# Patient Record
Sex: MALE | Race: WHITE | NOT HISPANIC OR LATINO | Employment: FULL TIME | URBAN - METROPOLITAN AREA
[De-identification: names, ages, dates, MRNs, and addresses within clinical notes are randomized per-mention and may not be internally consistent; named-entity substitution may affect disease eponyms.]

---

## 2019-02-18 ENCOUNTER — OFFICE VISIT (OUTPATIENT)
Dept: FAMILY MEDICINE | Facility: CLINIC | Age: 54
End: 2019-02-18
Payer: COMMERCIAL

## 2019-02-18 ENCOUNTER — TELEPHONE (OUTPATIENT)
Dept: FAMILY MEDICINE | Facility: CLINIC | Age: 54
End: 2019-02-18

## 2019-02-18 VITALS
HEART RATE: 85 BPM | HEIGHT: 69 IN | OXYGEN SATURATION: 98 % | WEIGHT: 246 LBS | DIASTOLIC BLOOD PRESSURE: 86 MMHG | SYSTOLIC BLOOD PRESSURE: 138 MMHG | TEMPERATURE: 99 F | BODY MASS INDEX: 36.43 KG/M2

## 2019-02-18 DIAGNOSIS — Z12.9 ENCOUNTER FOR CANCER SCREENING: ICD-10-CM

## 2019-02-18 DIAGNOSIS — F41.0 ANXIETY ATTACK: Primary | ICD-10-CM

## 2019-02-18 DIAGNOSIS — Z12.5 ENCOUNTER FOR PROSTATE CANCER SCREENING: ICD-10-CM

## 2019-02-18 DIAGNOSIS — Z12.5 ENCOUNTER FOR PROSTATE CANCER SCREENING: Primary | ICD-10-CM

## 2019-02-18 DIAGNOSIS — Z00.00 PREVENTATIVE HEALTH CARE: ICD-10-CM

## 2019-02-18 PROCEDURE — 99213 PR OFFICE/OUTPT VISIT, EST, LEVL III, 20-29 MIN: ICD-10-PCS | Mod: ,,, | Performed by: FAMILY MEDICINE

## 2019-02-18 PROCEDURE — 99213 OFFICE O/P EST LOW 20 MIN: CPT | Mod: ,,, | Performed by: FAMILY MEDICINE

## 2019-02-18 RX ORDER — CITALOPRAM 20 MG/1
20 TABLET, FILM COATED ORAL DAILY
Qty: 30 TABLET | Refills: 11 | Status: SHIPPED | OUTPATIENT
Start: 2019-02-18 | End: 2022-08-26

## 2019-02-18 NOTE — PROGRESS NOTES
Subjective:       Patient ID: Landry Hartmann is a 53 y.o. male.    Chief Complaint: Anxiety      Patient is is here because he is to get reestablished. Also has suffers with anxiety and has a problem in the epigastrium. Has a history of tolerating spicy foods without problems.  Has a lot of stress at home wife has been very sick and has a lot of financial problems related to that as well.      Anxiety   Presents for initial visit. Onset was 1 to 4 weeks ago. The problem has been waxing and waning. Symptoms include compulsions, dizziness, irritability, nervous/anxious behavior and panic. Patient reports no chest pain, shortness of breath or suicidal ideas. The severity of symptoms is incapacitating and moderate.     His past medical history is significant for anxiety/panic attacks.       Allergies and Medications:   Review of patient's allergies indicates:  No Known Allergies  Current Outpatient Medications   Medication Sig Dispense Refill    citalopram (CELEXA) 20 MG tablet Take 1 tablet (20 mg total) by mouth once daily. 30 tablet 11     No current facility-administered medications for this visit.        Family History:   History reviewed. No pertinent family history.    Social History:   Social History     Socioeconomic History    Marital status:      Spouse name: Not on file    Number of children: Not on file    Years of education: Not on file    Highest education level: Not on file   Social Needs    Financial resource strain: Not on file    Food insecurity - worry: Not on file    Food insecurity - inability: Not on file    Transportation needs - medical: Not on file    Transportation needs - non-medical: Not on file   Occupational History    Not on file   Tobacco Use    Smoking status: Current Every Day Smoker    Smokeless tobacco: Never Used   Substance and Sexual Activity    Alcohol use: No     Frequency: Never    Drug use: No    Sexual activity: Yes   Other Topics Concern    Not on file    Social History Narrative    Not on file       Review of Systems   Constitutional: Positive for irritability.   Respiratory: Negative for shortness of breath.    Cardiovascular: Negative for chest pain.   Neurological: Positive for dizziness.   Psychiatric/Behavioral: Negative for suicidal ideas. The patient is nervous/anxious.      patient declined flu shot today  Objective:     Vitals:    02/18/19 1524   BP: 138/86   Pulse: 85   Temp: 98.5 °F (36.9 °C)        Physical Exam   Constitutional: He appears well-developed and well-nourished. No distress.   HENT:   Head: Normocephalic and atraumatic.   Right Ear: Hearing, tympanic membrane, external ear and ear canal normal. No drainage, swelling or tenderness. No foreign bodies. Tympanic membrane is not injected, not scarred, not perforated, not erythematous, not retracted and not bulging. Tympanic membrane mobility is normal. No middle ear effusion. No hemotympanum. No decreased hearing is noted.   Left Ear: Hearing, tympanic membrane, external ear and ear canal normal. No drainage, swelling or tenderness. No foreign bodies. Tympanic membrane is not injected, not scarred, not perforated, not erythematous, not retracted and not bulging. Tympanic membrane mobility is normal.  No middle ear effusion. No hemotympanum. No decreased hearing is noted.   Nose: Nose normal. No mucosal edema, rhinorrhea, nose lacerations, sinus tenderness, nasal deformity or septal deviation. Right sinus exhibits no maxillary sinus tenderness and no frontal sinus tenderness. Left sinus exhibits no maxillary sinus tenderness and no frontal sinus tenderness.   Mouth/Throat: Uvula is midline and oropharynx is clear and moist. Normal dentition. No oropharyngeal exudate, posterior oropharyngeal edema, posterior oropharyngeal erythema or tonsillar abscesses.   Eyes: Conjunctivae and EOM are normal. Pupils are equal, round, and reactive to light. Right eye exhibits no discharge. Left eye exhibits no  discharge. No scleral icterus.   Neck: Normal range of motion. Neck supple. No thyromegaly present.   Cardiovascular: Normal rate, regular rhythm, normal heart sounds and intact distal pulses. Exam reveals no gallop and no friction rub.   No murmur heard.  Pulmonary/Chest: Effort normal and breath sounds normal. No stridor. No respiratory distress. He has no wheezes. He has no rales. He exhibits no tenderness.   Lymphadenopathy:     He has no cervical adenopathy.   Skin: He is not diaphoretic.   Nursing note and vitals reviewed.    patient declined flu vaccine  Assessment:       1. Anxiety attack    2. Encounter for cancer screening    3. Encounter for prostate cancer screening    4. Preventative health care        Plan:       Landry was seen today for anxiety.    Diagnoses and all orders for this visit:    Anxiety attack  -     Ambulatory referral to Gastroenterology    Encounter for cancer screening  -     citalopram (CELEXA) 20 MG tablet; Take 1 tablet (20 mg total) by mouth once daily.    Encounter for prostate cancer screening  -     PSA, Screening; Future  -     PSA, Screening    Preventative health care  -     Comprehensive metabolic panel; Future  -     Hemoglobin A1c; Future  -     Hepatitis C antibody; Future  -     Lipid panel; Future  -     Comprehensive metabolic panel  -     Hemoglobin A1c  -     Hepatitis C antibody  -     Lipid panel         Follow-up in about 2 months (around 4/18/2019) for annual.

## 2019-02-20 ENCOUNTER — TELEPHONE (OUTPATIENT)
Dept: FAMILY MEDICINE | Facility: CLINIC | Age: 54
End: 2019-02-20

## 2019-02-20 DIAGNOSIS — I10 ESSENTIAL HYPERTENSION: Primary | ICD-10-CM

## 2019-02-20 LAB
ALBUMIN SERPL-MCNC: 4.1 G/DL (ref 3.6–5.1)
ALBUMIN/GLOB SERPL: 1.3 (CALC) (ref 1–2.5)
ALP SERPL-CCNC: 79 U/L (ref 40–115)
ALT SERPL-CCNC: 36 U/L (ref 9–46)
AST SERPL-CCNC: 18 U/L (ref 10–35)
BILIRUB SERPL-MCNC: 0.4 MG/DL (ref 0.2–1.2)
BUN SERPL-MCNC: 12 MG/DL (ref 7–25)
BUN/CREAT SERPL: ABNORMAL (CALC) (ref 6–22)
CALCIUM SERPL-MCNC: 9 MG/DL (ref 8.6–10.3)
CHLORIDE SERPL-SCNC: 107 MMOL/L (ref 98–110)
CHOLEST SERPL-MCNC: 250 MG/DL
CHOLEST/HDLC SERPL: 6.4 (CALC)
CO2 SERPL-SCNC: 25 MMOL/L (ref 20–32)
CREAT SERPL-MCNC: 0.99 MG/DL (ref 0.7–1.33)
GFRSERPLBLD MDRD-ARVRAT: 87 ML/MIN/1.73M2
GLOBULIN SER CALC-MCNC: 3.2 G/DL (CALC) (ref 1.9–3.7)
GLUCOSE SERPL-MCNC: 118 MG/DL (ref 65–99)
HBA1C MFR BLD: 6 % OF TOTAL HGB
HCV AB S/CO SERPL IA: 0.01
HCV AB SERPL QL IA: NORMAL
HDLC SERPL-MCNC: 39 MG/DL
LDLC SERPL CALC-MCNC: 167 MG/DL (CALC)
NONHDLC SERPL-MCNC: 211 MG/DL (CALC)
POTASSIUM SERPL-SCNC: 4.4 MMOL/L (ref 3.5–5.3)
PROT SERPL-MCNC: 7.3 G/DL (ref 6.1–8.1)
PSA SERPL-MCNC: 2.4 NG/ML
SODIUM SERPL-SCNC: 140 MMOL/L (ref 135–146)
TRIGL SERPL-MCNC: 267 MG/DL

## 2019-02-20 RX ORDER — PRAVASTATIN SODIUM 20 MG/1
20 TABLET ORAL DAILY
Qty: 30 TABLET | Refills: 11 | Status: SHIPPED | OUTPATIENT
Start: 2019-02-20 | End: 2020-02-20

## 2019-02-20 NOTE — TELEPHONE ENCOUNTER
Patient had elevated cholesterol and LDLs discussed with the patient and will begin pravastatin 20 mg per day and recheck in 6 weeks.

## 2019-04-15 ENCOUNTER — PATIENT MESSAGE (OUTPATIENT)
Dept: FAMILY MEDICINE | Facility: CLINIC | Age: 54
End: 2019-04-15

## 2019-05-20 PROBLEM — Z00.00 PREVENTATIVE HEALTH CARE: Status: RESOLVED | Noted: 2019-02-18 | Resolved: 2019-05-20

## 2022-07-20 ENCOUNTER — PATIENT MESSAGE (OUTPATIENT)
Dept: FAMILY MEDICINE | Facility: CLINIC | Age: 57
End: 2022-07-20

## 2022-08-26 ENCOUNTER — LAB VISIT (OUTPATIENT)
Dept: LAB | Facility: HOSPITAL | Age: 57
End: 2022-08-26
Attending: FAMILY MEDICINE
Payer: COMMERCIAL

## 2022-08-26 ENCOUNTER — OFFICE VISIT (OUTPATIENT)
Dept: FAMILY MEDICINE | Facility: CLINIC | Age: 57
End: 2022-08-26
Payer: COMMERCIAL

## 2022-08-26 VITALS
TEMPERATURE: 98 F | WEIGHT: 241.19 LBS | OXYGEN SATURATION: 96 % | HEIGHT: 69 IN | RESPIRATION RATE: 18 BRPM | DIASTOLIC BLOOD PRESSURE: 82 MMHG | SYSTOLIC BLOOD PRESSURE: 130 MMHG | BODY MASS INDEX: 35.72 KG/M2 | HEART RATE: 89 BPM

## 2022-08-26 DIAGNOSIS — E66.9 CLASS 2 OBESITY WITH BODY MASS INDEX (BMI) OF 35.0 TO 35.9 IN ADULT, UNSPECIFIED OBESITY TYPE, UNSPECIFIED WHETHER SERIOUS COMORBIDITY PRESENT: ICD-10-CM

## 2022-08-26 DIAGNOSIS — F17.200 SMOKING: ICD-10-CM

## 2022-08-26 DIAGNOSIS — Z12.11 COLON CANCER SCREENING: ICD-10-CM

## 2022-08-26 DIAGNOSIS — Z00.00 PREVENTATIVE HEALTH CARE: ICD-10-CM

## 2022-08-26 DIAGNOSIS — Z00.00 PREVENTATIVE HEALTH CARE: Primary | ICD-10-CM

## 2022-08-26 PROBLEM — E66.812 CLASS 2 OBESITY WITH BODY MASS INDEX (BMI) OF 35.0 TO 35.9 IN ADULT: Status: ACTIVE | Noted: 2022-08-26

## 2022-08-26 LAB
25(OH)D3+25(OH)D2 SERPL-MCNC: 20 NG/ML (ref 30–96)
ALBUMIN SERPL BCP-MCNC: 4.3 G/DL (ref 3.5–5.2)
ALP SERPL-CCNC: 67 U/L (ref 55–135)
ALT SERPL W/O P-5'-P-CCNC: 33 U/L (ref 10–44)
ANION GAP SERPL CALC-SCNC: 8 MMOL/L (ref 8–16)
AST SERPL-CCNC: 21 U/L (ref 10–40)
BILIRUB SERPL-MCNC: 0.8 MG/DL (ref 0.1–1)
BUN SERPL-MCNC: 10 MG/DL (ref 6–20)
CALCIUM SERPL-MCNC: 9.1 MG/DL (ref 8.7–10.5)
CHLORIDE SERPL-SCNC: 103 MMOL/L (ref 95–110)
CHOLEST SERPL-MCNC: 233 MG/DL (ref 120–199)
CHOLEST/HDLC SERPL: 6.3 {RATIO} (ref 2–5)
CO2 SERPL-SCNC: 28 MMOL/L (ref 23–29)
COMPLEXED PSA SERPL-MCNC: 2.6 NG/ML (ref 0–4)
CREAT SERPL-MCNC: 0.9 MG/DL (ref 0.5–1.4)
EST. GFR  (NO RACE VARIABLE): >60 ML/MIN/1.73 M^2
GLUCOSE SERPL-MCNC: 91 MG/DL (ref 70–110)
HDLC SERPL-MCNC: 37 MG/DL (ref 40–75)
HDLC SERPL: 15.9 % (ref 20–50)
LDLC SERPL CALC-MCNC: 160.2 MG/DL (ref 63–159)
NONHDLC SERPL-MCNC: 196 MG/DL
POTASSIUM SERPL-SCNC: 4.3 MMOL/L (ref 3.5–5.1)
PROT SERPL-MCNC: 7.7 G/DL (ref 6–8.4)
SODIUM SERPL-SCNC: 139 MMOL/L (ref 136–145)
TRIGL SERPL-MCNC: 179 MG/DL (ref 30–150)

## 2022-08-26 PROCEDURE — 1159F MED LIST DOCD IN RCRD: CPT | Mod: CPTII,S$GLB,, | Performed by: FAMILY MEDICINE

## 2022-08-26 PROCEDURE — 1159F PR MEDICATION LIST DOCUMENTED IN MEDICAL RECORD: ICD-10-PCS | Mod: CPTII,S$GLB,, | Performed by: FAMILY MEDICINE

## 2022-08-26 PROCEDURE — 99214 OFFICE O/P EST MOD 30 MIN: CPT | Mod: S$GLB,,, | Performed by: FAMILY MEDICINE

## 2022-08-26 PROCEDURE — 82306 VITAMIN D 25 HYDROXY: CPT | Performed by: FAMILY MEDICINE

## 2022-08-26 PROCEDURE — 3008F PR BODY MASS INDEX (BMI) DOCUMENTED: ICD-10-PCS | Mod: CPTII,S$GLB,, | Performed by: FAMILY MEDICINE

## 2022-08-26 PROCEDURE — 87389 HIV-1 AG W/HIV-1&-2 AB AG IA: CPT | Performed by: FAMILY MEDICINE

## 2022-08-26 PROCEDURE — 3008F BODY MASS INDEX DOCD: CPT | Mod: CPTII,S$GLB,, | Performed by: FAMILY MEDICINE

## 2022-08-26 PROCEDURE — 36415 COLL VENOUS BLD VENIPUNCTURE: CPT | Performed by: FAMILY MEDICINE

## 2022-08-26 PROCEDURE — 86803 HEPATITIS C AB TEST: CPT | Performed by: FAMILY MEDICINE

## 2022-08-26 PROCEDURE — 80061 LIPID PANEL: CPT | Performed by: FAMILY MEDICINE

## 2022-08-26 PROCEDURE — 84153 ASSAY OF PSA TOTAL: CPT | Performed by: FAMILY MEDICINE

## 2022-08-26 PROCEDURE — 83036 HEMOGLOBIN GLYCOSYLATED A1C: CPT | Performed by: FAMILY MEDICINE

## 2022-08-26 PROCEDURE — 99214 PR OFFICE/OUTPT VISIT, EST, LEVL IV, 30-39 MIN: ICD-10-PCS | Mod: S$GLB,,, | Performed by: FAMILY MEDICINE

## 2022-08-26 PROCEDURE — 80053 COMPREHEN METABOLIC PANEL: CPT | Performed by: FAMILY MEDICINE

## 2022-08-26 NOTE — PROGRESS NOTES
Subjective:       Patient ID: Landry Hartmann is a 57 y.o. male.    Chief Complaint: Mass (When patient leans back, he has a lump that comes up on abdomen, denies pain)      Patient was sent in at wife's insistence to look at a bulge in the upper abdomen.  He has no pain at no dry-in or swallowing issues.  He is not interested in surgery at this time.  Patient Active Problem List:     Anxiety attack     Encounter for cancer screening     Encounter for prostate cancer screening        Mass  This is a new problem. The current episode started more than 1 year ago. The problem has been gradually worsening. Pertinent negatives include no anorexia, arthralgias or change in bowel habit.       Allergies and Medications:   Review of patient's allergies indicates:  No Known Allergies  Current Outpatient Medications   Medication Sig Dispense Refill    citalopram (CELEXA) 20 MG tablet Take 1 tablet (20 mg total) by mouth once daily. 30 tablet 11    pravastatin (PRAVACHOL) 20 MG tablet Take 1 tablet (20 mg total) by mouth once daily. 30 tablet 11     No current facility-administered medications for this visit.       Family History:   History reviewed. No pertinent family history.    Social History:   Social History     Socioeconomic History    Marital status:    Tobacco Use    Smoking status: Current Every Day Smoker    Smokeless tobacco: Never Used   Substance and Sexual Activity    Alcohol use: No    Drug use: No    Sexual activity: Yes       Review of Systems   Gastrointestinal: Negative for anorexia and change in bowel habit.   Musculoskeletal: Negative for arthralgias.       Objective:     Vitals:    08/26/22 1327   BP: 130/82   Pulse: 89   Resp: 18   Temp: 97.8 °F (36.6 °C)        Physical Exam  Vitals and nursing note reviewed.   Constitutional:       Appearance: He is well-developed. He is not diaphoretic.   HENT:      Head: Normocephalic.   Eyes:      Conjunctiva/sclera: Conjunctivae normal.      Pupils:  Pupils are equal, round, and reactive to light.   Cardiovascular:      Rate and Rhythm: Normal rate and regular rhythm.      Heart sounds: Normal heart sounds. No murmur heard.    No friction rub. No gallop.   Pulmonary:      Effort: Pulmonary effort is normal. No respiratory distress.      Breath sounds: Normal breath sounds. No stridor. No wheezing or rales.   Chest:      Chest wall: No tenderness.   Skin:     General: Skin is warm and dry.   Psychiatric:         Behavior: Behavior normal.         Thought Content: Thought content normal.         Judgment: Judgment normal.         Assessment:       1. Preventative health care    2. Class 2 obesity with body mass index (BMI) of 35.0 to 35.9 in adult, unspecified obesity type, unspecified whether serious comorbidity present    3. Colon cancer screening    4. Smoking        Plan:       Landry was seen today for [facility].    Diagnoses and all orders for this visit:    Preventative health care  -     Comprehensive Metabolic Panel; Future  -     Lipid Panel; Future  -     Hemoglobin A1C; Future  -     PSA, Screening; Future  -     HIV 1/2 Ag/Ab (4th Gen); Future  -     Hepatitis C Antibody; Future    Class 2 obesity with body mass index (BMI) of 35.0 to 35.9 in adult, unspecified obesity type, unspecified whether serious comorbidity present  -     Vitamin D; Future    Colon cancer screening  -     Cologuard Screening (Multitarget Stool DNA); Future  -     Cologuard Screening (Multitarget Stool DNA)    Smoking  -     Pneumococcal Conjugate Vaccine (20 Valent) (IM)         Follow up in about 3 months (around 11/26/2022) for annual.

## 2022-08-27 LAB
ESTIMATED AVG GLUCOSE: 117 MG/DL (ref 68–131)
HBA1C MFR BLD: 5.7 % (ref 4.5–6.2)

## 2022-08-28 LAB
HCV AB S/CO SERPL IA: <0.1 S/CO RATIO (ref 0–0.9)
HIV 1+2 AB+HIV1 P24 AG SERPL QL IA: NON REACTIVE

## 2022-08-30 ENCOUNTER — TELEPHONE (OUTPATIENT)
Dept: FAMILY MEDICINE | Facility: CLINIC | Age: 57
End: 2022-08-30

## 2022-08-30 NOTE — TELEPHONE ENCOUNTER
----- Message from Antonio Devine MD sent at 8/28/2022  4:10 PM CDT -----  Results Ok, notify patient.

## 2023-05-02 ENCOUNTER — TELEPHONE (OUTPATIENT)
Dept: FAMILY MEDICINE | Facility: CLINIC | Age: 58
End: 2023-05-02

## 2023-08-29 ENCOUNTER — OFFICE VISIT (OUTPATIENT)
Dept: DERMATOLOGY | Facility: CLINIC | Age: 58
End: 2023-08-29
Payer: COMMERCIAL

## 2023-08-29 DIAGNOSIS — B35.4 TINEA CORPORIS: Primary | ICD-10-CM

## 2023-08-29 DIAGNOSIS — B35.1 ONYCHOMYCOSIS: ICD-10-CM

## 2023-08-29 PROCEDURE — 1159F MED LIST DOCD IN RCRD: CPT | Mod: CPTII,S$GLB,, | Performed by: STUDENT IN AN ORGANIZED HEALTH CARE EDUCATION/TRAINING PROGRAM

## 2023-08-29 PROCEDURE — 99203 PR OFFICE/OUTPT VISIT, NEW, LEVL III, 30-44 MIN: ICD-10-PCS | Mod: S$GLB,,, | Performed by: STUDENT IN AN ORGANIZED HEALTH CARE EDUCATION/TRAINING PROGRAM

## 2023-08-29 PROCEDURE — 99203 OFFICE O/P NEW LOW 30 MIN: CPT | Mod: S$GLB,,, | Performed by: STUDENT IN AN ORGANIZED HEALTH CARE EDUCATION/TRAINING PROGRAM

## 2023-08-29 PROCEDURE — 99999 PR PBB SHADOW E&M-EST. PATIENT-LVL III: ICD-10-PCS | Mod: PBBFAC,,, | Performed by: STUDENT IN AN ORGANIZED HEALTH CARE EDUCATION/TRAINING PROGRAM

## 2023-08-29 PROCEDURE — 1160F PR REVIEW ALL MEDS BY PRESCRIBER/CLIN PHARMACIST DOCUMENTED: ICD-10-PCS | Mod: CPTII,S$GLB,, | Performed by: STUDENT IN AN ORGANIZED HEALTH CARE EDUCATION/TRAINING PROGRAM

## 2023-08-29 PROCEDURE — 1160F RVW MEDS BY RX/DR IN RCRD: CPT | Mod: CPTII,S$GLB,, | Performed by: STUDENT IN AN ORGANIZED HEALTH CARE EDUCATION/TRAINING PROGRAM

## 2023-08-29 PROCEDURE — 99999 PR PBB SHADOW E&M-EST. PATIENT-LVL III: CPT | Mod: PBBFAC,,, | Performed by: STUDENT IN AN ORGANIZED HEALTH CARE EDUCATION/TRAINING PROGRAM

## 2023-08-29 PROCEDURE — 1159F PR MEDICATION LIST DOCUMENTED IN MEDICAL RECORD: ICD-10-PCS | Mod: CPTII,S$GLB,, | Performed by: STUDENT IN AN ORGANIZED HEALTH CARE EDUCATION/TRAINING PROGRAM

## 2023-08-29 RX ORDER — CICLOPIROX OLAMINE 7.7 MG/G
CREAM TOPICAL 2 TIMES DAILY
Qty: 180 G | Refills: 1 | Status: SHIPPED | OUTPATIENT
Start: 2023-08-29

## 2023-08-29 RX ORDER — TERBINAFINE HYDROCHLORIDE 250 MG/1
250 TABLET ORAL DAILY
Qty: 30 TABLET | Refills: 0 | Status: SHIPPED | OUTPATIENT
Start: 2023-08-29 | End: 2023-09-28

## 2023-08-29 NOTE — PROGRESS NOTES
Subjective:      Patient ID:  Landry Hartmann is a 58 y.o. male who presents for   Chief Complaint   Patient presents with    Rash     B/l arms and legs     Pt is here today for a rash on b/l arms, present intermittently for years.  Pt states that the symptoms have worsened in the last month. Has used several OTC medications with mild relief.      Rash      Review of Systems   Skin:  Positive for rash.       Objective:   Physical Exam   Constitutional: He appears well-developed and well-nourished. No distress.   Neurological: He is alert and oriented to person, place, and time. He is not disoriented.   Psychiatric: He has a normal mood and affect.   Skin:   Areas Examined (abnormalities noted in diagram):   RUE Inspected  LUE Inspection Performed            Diagram Legend     Erythematous scaling macule/papule c/w actinic keratosis       Vascular papule c/w angioma      Pigmented verrucoid papule/plaque c/w seborrheic keratosis      Yellow umbilicated papule c/w sebaceous hyperplasia      Irregularly shaped tan macule c/w lentigo     1-2 mm smooth white papules consistent with Milia      Movable subcutaneous cyst with punctum c/w epidermal inclusion cyst      Subcutaneous movable cyst c/w pilar cyst      Firm pink to brown papule c/w dermatofibroma      Pedunculated fleshy papule(s) c/w skin tag(s)      Evenly pigmented macule c/w junctional nevus     Mildly variegated pigmented, slightly irregular-bordered macule c/w mildly atypical nevus      Flesh colored to evenly pigmented papule c/w intradermal nevus       Pink pearly papule/plaque c/w basal cell carcinoma      Erythematous hyperkeratotic cursted plaque c/w SCC      Surgical scar with no sign of skin cancer recurrence      Open and closed comedones      Inflammatory papules and pustules      Verrucoid papule consistent consistent with wart     Erythematous eczematous patches and plaques     Dystrophic onycholytic nail with subungual debris c/w onychomycosis      Umbilicated papule    Erythematous-base heme-crusted tan verrucoid plaque consistent with inflamed seborrheic keratosis     Erythematous Silvery Scaling Plaque c/w Psoriasis     See annotation            Assessment / Plan:        Tinea corporis  Onychomycosis  -     terbinafine HCL (LAMISIL) 250 mg tablet; Take 1 tablet (250 mg total) by mouth once daily.  Dispense: 30 tablet; Refill: 0  -     ciclopirox (LOPROX) 0.77 % Crea; Apply topically 2 (two) times daily. Apply to fungal rash on arms for 4-6 weeks  Dispense: 180 g; Refill: 1    - Counseled on treatment options. Will likely require PO therapy to cure. Even with PO therapy, cure rate is about 70-80% and condition can recur. PO medications can have rare risk of systemic side effects including transaminitis and interact with other meds  - Terbinafine 250 mg qd  x 6 weeks for fingernails    - Discussed the risk of common side effects, such as nausea / vomiting and taste changes, and more serious but rare side effects, including hepatotoxicity and cytopenias.   - No h/o liver disease. On no medications         Follow up in about 6 weeks (around 10/10/2023).

## 2024-07-09 ENCOUNTER — PATIENT MESSAGE (OUTPATIENT)
Dept: ADMINISTRATIVE | Facility: HOSPITAL | Age: 59
End: 2024-07-09
Payer: COMMERCIAL

## 2024-10-28 ENCOUNTER — ANESTHESIA (OUTPATIENT)
Dept: SURGERY | Facility: HOSPITAL | Age: 59
End: 2024-10-28
Payer: COMMERCIAL

## 2024-10-28 ENCOUNTER — HOSPITAL ENCOUNTER (INPATIENT)
Facility: HOSPITAL | Age: 59
LOS: 2 days | Discharge: HOME | DRG: 399 | End: 2024-10-30
Attending: EMERGENCY MEDICINE | Admitting: INTERNAL MEDICINE
Payer: COMMERCIAL

## 2024-10-28 ENCOUNTER — ANESTHESIA EVENT (OUTPATIENT)
Dept: SURGERY | Facility: HOSPITAL | Age: 59
End: 2024-10-28
Payer: COMMERCIAL

## 2024-10-28 DIAGNOSIS — K37 APPENDICITIS, UNSPECIFIED APPENDICITIS TYPE: ICD-10-CM

## 2024-10-28 DIAGNOSIS — K35.32 PERFORATED APPENDICITIS: Primary | ICD-10-CM

## 2024-10-28 DIAGNOSIS — R07.9 CHEST PAIN: ICD-10-CM

## 2024-10-28 DIAGNOSIS — R10.9 ABDOMINAL PAIN: ICD-10-CM

## 2024-10-28 DIAGNOSIS — K35.200 ACUTE APPENDICITIS WITH GENERALIZED PERITONITIS WITHOUT GANGRENE, PERFORATION, OR ABSCESS: ICD-10-CM

## 2024-10-28 PROBLEM — E83.51 HYPOCALCEMIA: Status: ACTIVE | Noted: 2024-10-28

## 2024-10-28 PROBLEM — F17.200 TOBACCO DEPENDENCY: Status: ACTIVE | Noted: 2024-10-28

## 2024-10-28 PROBLEM — K35.211 ACUTE APPENDICITIS WITH PERFORATION, GENERALIZED PERITONITIS, AND ABSCESS, WITHOUT GANGRENE: Status: ACTIVE | Noted: 2024-10-28

## 2024-10-28 PROBLEM — I70.90 ATHEROSCLEROTIC PLAQUE: Status: ACTIVE | Noted: 2024-10-28

## 2024-10-28 LAB
ALBUMIN SERPL BCP-MCNC: 3.7 G/DL (ref 3.5–5.2)
ALP SERPL-CCNC: 50 U/L (ref 55–135)
ALT SERPL W/O P-5'-P-CCNC: 16 U/L (ref 10–44)
ANION GAP SERPL CALC-SCNC: 8 MMOL/L (ref 8–16)
AST SERPL-CCNC: 10 U/L (ref 10–40)
BASOPHILS # BLD AUTO: 0.03 K/UL (ref 0–0.2)
BASOPHILS NFR BLD: 0.2 % (ref 0–1.9)
BILIRUB SERPL-MCNC: 1 MG/DL (ref 0.1–1)
BUN SERPL-MCNC: 17 MG/DL (ref 6–20)
CALCIUM SERPL-MCNC: 8.5 MG/DL (ref 8.7–10.5)
CHLORIDE SERPL-SCNC: 103 MMOL/L (ref 95–110)
CO2 SERPL-SCNC: 27 MMOL/L (ref 23–29)
CREAT SERPL-MCNC: 1 MG/DL (ref 0.5–1.4)
CREAT SERPL-MCNC: 1 MG/DL (ref 0.5–1.4)
DIFFERENTIAL METHOD BLD: ABNORMAL
EOSINOPHIL # BLD AUTO: 0.1 K/UL (ref 0–0.5)
EOSINOPHIL NFR BLD: 0.8 % (ref 0–8)
ERYTHROCYTE [DISTWIDTH] IN BLOOD BY AUTOMATED COUNT: 13.7 % (ref 11.5–14.5)
EST. GFR  (NO RACE VARIABLE): >60 ML/MIN/1.73 M^2
GLUCOSE SERPL-MCNC: 131 MG/DL (ref 70–110)
HCT VFR BLD AUTO: 45.4 % (ref 40–54)
HGB BLD-MCNC: 14.9 G/DL (ref 14–18)
IMM GRANULOCYTES # BLD AUTO: 0.06 K/UL (ref 0–0.04)
IMM GRANULOCYTES NFR BLD AUTO: 0.4 % (ref 0–0.5)
LIPASE SERPL-CCNC: 29 U/L (ref 4–60)
LYMPHOCYTES # BLD AUTO: 1.9 K/UL (ref 1–4.8)
LYMPHOCYTES NFR BLD: 11.8 % (ref 18–48)
MCH RBC QN AUTO: 29.7 PG (ref 27–31)
MCHC RBC AUTO-ENTMCNC: 32.8 G/DL (ref 32–36)
MCV RBC AUTO: 91 FL (ref 82–98)
MONOCYTES # BLD AUTO: 1.7 K/UL (ref 0.3–1)
MONOCYTES NFR BLD: 10.3 % (ref 4–15)
NEUTROPHILS # BLD AUTO: 12.2 K/UL (ref 1.8–7.7)
NEUTROPHILS NFR BLD: 76.5 % (ref 38–73)
NRBC BLD-RTO: 0 /100 WBC
PLATELET # BLD AUTO: 230 K/UL (ref 150–450)
PMV BLD AUTO: 11.5 FL (ref 9.2–12.9)
POTASSIUM SERPL-SCNC: 3.8 MMOL/L (ref 3.5–5.1)
PROT SERPL-MCNC: 7.1 G/DL (ref 6–8.4)
RBC # BLD AUTO: 5.01 M/UL (ref 4.6–6.2)
SAMPLE: NORMAL
SODIUM SERPL-SCNC: 138 MMOL/L (ref 136–145)
WBC # BLD AUTO: 15.97 K/UL (ref 3.9–12.7)

## 2024-10-28 PROCEDURE — 63600175 PHARM REV CODE 636 W HCPCS: Performed by: SURGERY

## 2024-10-28 PROCEDURE — 27201423 OPTIME MED/SURG SUP & DEVICES STERILE SUPPLY: Performed by: SURGERY

## 2024-10-28 PROCEDURE — 0DTJ4ZZ RESECTION OF APPENDIX, PERCUTANEOUS ENDOSCOPIC APPROACH: ICD-10-PCS | Performed by: SURGERY

## 2024-10-28 PROCEDURE — 71000039 HC RECOVERY, EACH ADD'L HOUR: Performed by: SURGERY

## 2024-10-28 PROCEDURE — 82565 ASSAY OF CREATININE: CPT

## 2024-10-28 PROCEDURE — 63600175 PHARM REV CODE 636 W HCPCS: Performed by: INTERNAL MEDICINE

## 2024-10-28 PROCEDURE — 85025 COMPLETE CBC W/AUTO DIFF WBC: CPT | Performed by: EMERGENCY MEDICINE

## 2024-10-28 PROCEDURE — D9220A PRA ANESTHESIA: Mod: CRNA,,,

## 2024-10-28 PROCEDURE — C9290 INJ, BUPIVACAINE LIPOSOME: HCPCS | Performed by: SURGERY

## 2024-10-28 PROCEDURE — 94799 UNLISTED PULMONARY SVC/PX: CPT | Mod: XB

## 2024-10-28 PROCEDURE — 25000003 PHARM REV CODE 250: Performed by: SURGERY

## 2024-10-28 PROCEDURE — 25000003 PHARM REV CODE 250: Performed by: EMERGENCY MEDICINE

## 2024-10-28 PROCEDURE — 63600175 PHARM REV CODE 636 W HCPCS

## 2024-10-28 PROCEDURE — 27000221 HC OXYGEN, UP TO 24 HOURS

## 2024-10-28 PROCEDURE — 99285 EMERGENCY DEPT VISIT HI MDM: CPT | Mod: 25

## 2024-10-28 PROCEDURE — 96365 THER/PROPH/DIAG IV INF INIT: CPT

## 2024-10-28 PROCEDURE — 94761 N-INVAS EAR/PLS OXIMETRY MLT: CPT

## 2024-10-28 PROCEDURE — 36000708 HC OR TIME LEV III 1ST 15 MIN: Performed by: SURGERY

## 2024-10-28 PROCEDURE — 36000709 HC OR TIME LEV III EA ADD 15 MIN: Performed by: SURGERY

## 2024-10-28 PROCEDURE — 37000009 HC ANESTHESIA EA ADD 15 MINS: Performed by: SURGERY

## 2024-10-28 PROCEDURE — 94799 UNLISTED PULMONARY SVC/PX: CPT

## 2024-10-28 PROCEDURE — 99223 1ST HOSP IP/OBS HIGH 75: CPT | Mod: 57,,, | Performed by: SURGERY

## 2024-10-28 PROCEDURE — 37000008 HC ANESTHESIA 1ST 15 MINUTES: Performed by: SURGERY

## 2024-10-28 PROCEDURE — 99900031 HC PATIENT EDUCATION (STAT)

## 2024-10-28 PROCEDURE — 83690 ASSAY OF LIPASE: CPT | Performed by: EMERGENCY MEDICINE

## 2024-10-28 PROCEDURE — 25000003 PHARM REV CODE 250: Performed by: INTERNAL MEDICINE

## 2024-10-28 PROCEDURE — 44970 LAPAROSCOPY APPENDECTOMY: CPT | Mod: ,,, | Performed by: SURGERY

## 2024-10-28 PROCEDURE — 25000003 PHARM REV CODE 250: Performed by: ANESTHESIOLOGY

## 2024-10-28 PROCEDURE — 25500020 PHARM REV CODE 255: Performed by: EMERGENCY MEDICINE

## 2024-10-28 PROCEDURE — 25000003 PHARM REV CODE 250

## 2024-10-28 PROCEDURE — 21400001 HC TELEMETRY ROOM

## 2024-10-28 PROCEDURE — 71000033 HC RECOVERY, INTIAL HOUR: Performed by: SURGERY

## 2024-10-28 PROCEDURE — 63600175 PHARM REV CODE 636 W HCPCS: Performed by: EMERGENCY MEDICINE

## 2024-10-28 PROCEDURE — D9220A PRA ANESTHESIA: Mod: ANES,,, | Performed by: ANESTHESIOLOGY

## 2024-10-28 PROCEDURE — 99900035 HC TECH TIME PER 15 MIN (STAT)

## 2024-10-28 PROCEDURE — 80053 COMPREHEN METABOLIC PANEL: CPT | Performed by: EMERGENCY MEDICINE

## 2024-10-28 RX ORDER — DIPHENHYDRAMINE HYDROCHLORIDE 50 MG/ML
12.5 INJECTION INTRAMUSCULAR; INTRAVENOUS
Status: DISCONTINUED | OUTPATIENT
Start: 2024-10-28 | End: 2024-10-28 | Stop reason: HOSPADM

## 2024-10-28 RX ORDER — SODIUM CHLORIDE 0.9 % (FLUSH) 0.9 %
10 SYRINGE (ML) INJECTION EVERY 12 HOURS PRN
Status: DISCONTINUED | OUTPATIENT
Start: 2024-10-28 | End: 2024-10-30 | Stop reason: HOSPADM

## 2024-10-28 RX ORDER — SODIUM CHLORIDE 9 MG/ML
INJECTION, SOLUTION INTRAVENOUS CONTINUOUS
Status: DISCONTINUED | OUTPATIENT
Start: 2024-10-28 | End: 2024-10-28

## 2024-10-28 RX ORDER — IBUPROFEN 200 MG
24 TABLET ORAL
Status: DISCONTINUED | OUTPATIENT
Start: 2024-10-28 | End: 2024-10-30 | Stop reason: HOSPADM

## 2024-10-28 RX ORDER — SODIUM CHLORIDE, SODIUM LACTATE, POTASSIUM CHLORIDE, CALCIUM CHLORIDE 600; 310; 30; 20 MG/100ML; MG/100ML; MG/100ML; MG/100ML
INJECTION, SOLUTION INTRAVENOUS CONTINUOUS PRN
Status: DISCONTINUED | OUTPATIENT
Start: 2024-10-28 | End: 2024-10-28

## 2024-10-28 RX ORDER — BUPIVACAINE HYDROCHLORIDE AND EPINEPHRINE 2.5; 5 MG/ML; UG/ML
INJECTION, SOLUTION EPIDURAL; INFILTRATION; INTRACAUDAL; PERINEURAL
Status: DISCONTINUED | OUTPATIENT
Start: 2024-10-28 | End: 2024-10-28 | Stop reason: HOSPADM

## 2024-10-28 RX ORDER — ENOXAPARIN SODIUM 100 MG/ML
40 INJECTION SUBCUTANEOUS EVERY 24 HOURS
Status: DISCONTINUED | OUTPATIENT
Start: 2024-10-28 | End: 2024-10-30 | Stop reason: HOSPADM

## 2024-10-28 RX ORDER — POTASSIUM CHLORIDE 7.45 MG/ML
40 INJECTION INTRAVENOUS
Status: DISCONTINUED | OUTPATIENT
Start: 2024-10-28 | End: 2024-10-30 | Stop reason: HOSPADM

## 2024-10-28 RX ORDER — FENTANYL CITRATE 50 UG/ML
INJECTION, SOLUTION INTRAMUSCULAR; INTRAVENOUS
Status: DISCONTINUED | OUTPATIENT
Start: 2024-10-28 | End: 2024-10-28

## 2024-10-28 RX ORDER — HYDROCODONE BITARTRATE AND ACETAMINOPHEN 5; 325 MG/1; MG/1
1 TABLET ORAL EVERY 4 HOURS PRN
Status: DISCONTINUED | OUTPATIENT
Start: 2024-10-28 | End: 2024-10-30 | Stop reason: HOSPADM

## 2024-10-28 RX ORDER — DEXMEDETOMIDINE HYDROCHLORIDE 100 UG/ML
INJECTION, SOLUTION INTRAVENOUS
Status: DISCONTINUED | OUTPATIENT
Start: 2024-10-28 | End: 2024-10-28

## 2024-10-28 RX ORDER — CALCIUM GLUCONATE 20 MG/ML
3 INJECTION, SOLUTION INTRAVENOUS
Status: DISCONTINUED | OUTPATIENT
Start: 2024-10-28 | End: 2024-10-30 | Stop reason: HOSPADM

## 2024-10-28 RX ORDER — ONDANSETRON 4 MG/1
8 TABLET, ORALLY DISINTEGRATING ORAL EVERY 8 HOURS PRN
Status: DISCONTINUED | OUTPATIENT
Start: 2024-10-28 | End: 2024-10-30 | Stop reason: HOSPADM

## 2024-10-28 RX ORDER — GLUCAGON 1 MG
1 KIT INJECTION
Status: DISCONTINUED | OUTPATIENT
Start: 2024-10-28 | End: 2024-10-30 | Stop reason: HOSPADM

## 2024-10-28 RX ORDER — HYDROMORPHONE HYDROCHLORIDE 1 MG/ML
0.2 INJECTION, SOLUTION INTRAMUSCULAR; INTRAVENOUS; SUBCUTANEOUS EVERY 5 MIN PRN
Status: DISCONTINUED | OUTPATIENT
Start: 2024-10-28 | End: 2024-10-28 | Stop reason: HOSPADM

## 2024-10-28 RX ORDER — NALOXONE HCL 0.4 MG/ML
0.02 VIAL (ML) INJECTION
Status: DISCONTINUED | OUTPATIENT
Start: 2024-10-28 | End: 2024-10-28

## 2024-10-28 RX ORDER — ENOXAPARIN SODIUM 100 MG/ML
40 INJECTION SUBCUTANEOUS EVERY 24 HOURS
Status: DISCONTINUED | OUTPATIENT
Start: 2024-10-28 | End: 2024-10-28

## 2024-10-28 RX ORDER — GLUCAGON 1 MG
1 KIT INJECTION
Status: DISCONTINUED | OUTPATIENT
Start: 2024-10-28 | End: 2024-10-28 | Stop reason: HOSPADM

## 2024-10-28 RX ORDER — PROPOFOL 10 MG/ML
VIAL (ML) INTRAVENOUS
Status: DISCONTINUED | OUTPATIENT
Start: 2024-10-28 | End: 2024-10-28

## 2024-10-28 RX ORDER — FAMOTIDINE 10 MG/ML
INJECTION INTRAVENOUS
Status: DISCONTINUED | OUTPATIENT
Start: 2024-10-28 | End: 2024-10-28

## 2024-10-28 RX ORDER — BUPIVACAINE 13.3 MG/ML
INJECTION, SUSPENSION, LIPOSOMAL INFILTRATION
Status: DISCONTINUED | OUTPATIENT
Start: 2024-10-28 | End: 2024-10-28 | Stop reason: HOSPADM

## 2024-10-28 RX ORDER — MIDAZOLAM HYDROCHLORIDE 1 MG/ML
INJECTION INTRAMUSCULAR; INTRAVENOUS
Status: DISCONTINUED | OUTPATIENT
Start: 2024-10-28 | End: 2024-10-28

## 2024-10-28 RX ORDER — IBUPROFEN 200 MG
16 TABLET ORAL
Status: DISCONTINUED | OUTPATIENT
Start: 2024-10-28 | End: 2024-10-30 | Stop reason: HOSPADM

## 2024-10-28 RX ORDER — SODIUM CHLORIDE 9 MG/ML
INJECTION, SOLUTION INTRAVENOUS CONTINUOUS
Status: DISCONTINUED | OUTPATIENT
Start: 2024-10-28 | End: 2024-10-30 | Stop reason: HOSPADM

## 2024-10-28 RX ORDER — POTASSIUM CHLORIDE 7.45 MG/ML
80 INJECTION INTRAVENOUS
Status: DISCONTINUED | OUTPATIENT
Start: 2024-10-28 | End: 2024-10-30 | Stop reason: HOSPADM

## 2024-10-28 RX ORDER — LIDOCAINE HYDROCHLORIDE 20 MG/ML
INJECTION INTRAVENOUS
Status: DISCONTINUED | OUTPATIENT
Start: 2024-10-28 | End: 2024-10-28

## 2024-10-28 RX ORDER — MAGNESIUM SULFATE HEPTAHYDRATE 40 MG/ML
4 INJECTION, SOLUTION INTRAVENOUS
Status: DISCONTINUED | OUTPATIENT
Start: 2024-10-28 | End: 2024-10-30 | Stop reason: HOSPADM

## 2024-10-28 RX ORDER — ONDANSETRON HYDROCHLORIDE 2 MG/ML
4 INJECTION, SOLUTION INTRAVENOUS DAILY PRN
Status: DISCONTINUED | OUTPATIENT
Start: 2024-10-28 | End: 2024-10-28 | Stop reason: HOSPADM

## 2024-10-28 RX ORDER — POTASSIUM CHLORIDE 7.45 MG/ML
60 INJECTION INTRAVENOUS
Status: DISCONTINUED | OUTPATIENT
Start: 2024-10-28 | End: 2024-10-30 | Stop reason: HOSPADM

## 2024-10-28 RX ORDER — MORPHINE SULFATE 2 MG/ML
2 INJECTION, SOLUTION INTRAMUSCULAR; INTRAVENOUS EVERY 6 HOURS PRN
Status: DISCONTINUED | OUTPATIENT
Start: 2024-10-28 | End: 2024-10-30 | Stop reason: HOSPADM

## 2024-10-28 RX ORDER — CALCIUM GLUCONATE 20 MG/ML
2 INJECTION, SOLUTION INTRAVENOUS
Status: DISCONTINUED | OUTPATIENT
Start: 2024-10-28 | End: 2024-10-30 | Stop reason: HOSPADM

## 2024-10-28 RX ORDER — MUPIROCIN 20 MG/G
1 OINTMENT TOPICAL 2 TIMES DAILY
Status: DISCONTINUED | OUTPATIENT
Start: 2024-10-28 | End: 2024-10-30 | Stop reason: HOSPADM

## 2024-10-28 RX ORDER — MAGNESIUM SULFATE HEPTAHYDRATE 40 MG/ML
2 INJECTION, SOLUTION INTRAVENOUS
Status: DISCONTINUED | OUTPATIENT
Start: 2024-10-28 | End: 2024-10-30 | Stop reason: HOSPADM

## 2024-10-28 RX ORDER — ROCURONIUM BROMIDE 10 MG/ML
INJECTION, SOLUTION INTRAVENOUS
Status: DISCONTINUED | OUTPATIENT
Start: 2024-10-28 | End: 2024-10-28

## 2024-10-28 RX ORDER — ACETAMINOPHEN 10 MG/ML
INJECTION, SOLUTION INTRAVENOUS
Status: DISCONTINUED | OUTPATIENT
Start: 2024-10-28 | End: 2024-10-28

## 2024-10-28 RX ORDER — MEPERIDINE HYDROCHLORIDE 50 MG/ML
12.5 INJECTION INTRAMUSCULAR; INTRAVENOUS; SUBCUTANEOUS EVERY 10 MIN PRN
Status: DISCONTINUED | OUTPATIENT
Start: 2024-10-28 | End: 2024-10-28 | Stop reason: HOSPADM

## 2024-10-28 RX ORDER — CALCIUM GLUCONATE 20 MG/ML
1 INJECTION, SOLUTION INTRAVENOUS
Status: DISCONTINUED | OUTPATIENT
Start: 2024-10-28 | End: 2024-10-30 | Stop reason: HOSPADM

## 2024-10-28 RX ORDER — ONDANSETRON HYDROCHLORIDE 2 MG/ML
INJECTION, SOLUTION INTRAVENOUS
Status: DISCONTINUED | OUTPATIENT
Start: 2024-10-28 | End: 2024-10-28

## 2024-10-28 RX ORDER — OXYCODONE HYDROCHLORIDE 5 MG/1
5 TABLET ORAL
Status: DISCONTINUED | OUTPATIENT
Start: 2024-10-28 | End: 2024-10-28 | Stop reason: HOSPADM

## 2024-10-28 RX ORDER — PANTOPRAZOLE SODIUM 40 MG/1
40 TABLET, DELAYED RELEASE ORAL DAILY
Status: DISCONTINUED | OUTPATIENT
Start: 2024-10-28 | End: 2024-10-30 | Stop reason: HOSPADM

## 2024-10-28 RX ORDER — NALOXONE HCL 0.4 MG/ML
0.02 VIAL (ML) INJECTION
Status: DISCONTINUED | OUTPATIENT
Start: 2024-10-28 | End: 2024-10-30 | Stop reason: HOSPADM

## 2024-10-28 RX ADMIN — FAMOTIDINE 20 MG: 10 INJECTION, SOLUTION INTRAVENOUS at 07:10

## 2024-10-28 RX ADMIN — FENTANYL CITRATE 50 MCG: 50 INJECTION, SOLUTION INTRAMUSCULAR; INTRAVENOUS at 08:10

## 2024-10-28 RX ADMIN — PIPERACILLIN SODIUM AND TAZOBACTAM SODIUM 3.38 G: 3; .375 INJECTION, POWDER, LYOPHILIZED, FOR SOLUTION INTRAVENOUS at 04:10

## 2024-10-28 RX ADMIN — PROPOFOL 180 MG: 10 INJECTION, EMULSION INTRAVENOUS at 07:10

## 2024-10-28 RX ADMIN — MUPIROCIN 1 G: 20 OINTMENT TOPICAL at 08:10

## 2024-10-28 RX ADMIN — HYDROCODONE BITARTRATE AND ACETAMINOPHEN 1 TABLET: 5; 325 TABLET ORAL at 04:10

## 2024-10-28 RX ADMIN — SODIUM CHLORIDE: 9 INJECTION, SOLUTION INTRAVENOUS at 11:10

## 2024-10-28 RX ADMIN — PIPERACILLIN SODIUM AND TAZOBACTAM SODIUM 3.38 G: 3; .375 INJECTION, POWDER, LYOPHILIZED, FOR SOLUTION INTRAVENOUS at 06:10

## 2024-10-28 RX ADMIN — ONDANSETRON 4 MG: 2 INJECTION INTRAMUSCULAR; INTRAVENOUS at 07:10

## 2024-10-28 RX ADMIN — ROCURONIUM BROMIDE 50 MG: 10 INJECTION, SOLUTION INTRAVENOUS at 07:10

## 2024-10-28 RX ADMIN — LIDOCAINE HYDROCHLORIDE 100 MG: 20 INJECTION, SOLUTION INTRAVENOUS at 07:10

## 2024-10-28 RX ADMIN — IOHEXOL 100 ML: 350 INJECTION, SOLUTION INTRAVENOUS at 04:10

## 2024-10-28 RX ADMIN — FENTANYL CITRATE 100 MCG: 50 INJECTION, SOLUTION INTRAMUSCULAR; INTRAVENOUS at 07:10

## 2024-10-28 RX ADMIN — DEXMEDETOMIDINE HYDROCHLORIDE 10 MCG: 100 INJECTION, SOLUTION INTRAVENOUS at 09:10

## 2024-10-28 RX ADMIN — SODIUM CHLORIDE: 9 INJECTION, SOLUTION INTRAVENOUS at 12:10

## 2024-10-28 RX ADMIN — ENOXAPARIN SODIUM 40 MG: 40 INJECTION SUBCUTANEOUS at 04:10

## 2024-10-28 RX ADMIN — SODIUM CHLORIDE, SODIUM LACTATE, POTASSIUM CHLORIDE, AND CALCIUM CHLORIDE: .6; .31; .03; .02 INJECTION, SOLUTION INTRAVENOUS at 07:10

## 2024-10-28 RX ADMIN — PIPERACILLIN SODIUM AND TAZOBACTAM SODIUM 3.38 G: 3; .375 INJECTION, POWDER, LYOPHILIZED, FOR SOLUTION INTRAVENOUS at 10:10

## 2024-10-28 RX ADMIN — MUPIROCIN 1 G: 20 OINTMENT TOPICAL at 12:10

## 2024-10-28 RX ADMIN — ACETAMINOPHEN 1000 MG: 10 INJECTION, SOLUTION INTRAVENOUS at 07:10

## 2024-10-28 RX ADMIN — ROCURONIUM BROMIDE 30 MG: 10 INJECTION, SOLUTION INTRAVENOUS at 08:10

## 2024-10-28 RX ADMIN — POTASSIUM CHLORIDE 40 MEQ: 10 INJECTION, SOLUTION INTRAVENOUS at 07:10

## 2024-10-28 RX ADMIN — SUGAMMADEX 200 MG: 100 INJECTION, SOLUTION INTRAVENOUS at 09:10

## 2024-10-28 RX ADMIN — DEXMEDETOMIDINE HYDROCHLORIDE 10 MCG: 100 INJECTION, SOLUTION INTRAVENOUS at 08:10

## 2024-10-28 RX ADMIN — OXYCODONE HYDROCHLORIDE 5 MG: 5 TABLET ORAL at 10:10

## 2024-10-28 RX ADMIN — MIDAZOLAM HYDROCHLORIDE 2 MG: 1 INJECTION, SOLUTION INTRAMUSCULAR; INTRAVENOUS at 07:10

## 2024-10-28 RX ADMIN — PANTOPRAZOLE SODIUM 40 MG: 40 TABLET, DELAYED RELEASE ORAL at 12:10

## 2024-10-28 NOTE — OP NOTE
Surgery Date: 10/28/2024     Surgeons and Role:     * Dusty French III, MD - Primary    Assisting Surgeon: None    Pre-op Diagnosis:  Acute appendicitis, unspecified acute appendicitis type [K35.80]    Post-op Diagnosis:  Post-Op Diagnosis Codes:     * Acute appendicitis with perforation at the mid appendix, with an abscess, without gangrene    Procedure(s) (LRB):  APPENDECTOMY, LAPAROSCOPIC (N/A) with drainage of right lower quadrant abscess and placement of JA drain    Anesthesia: General    Implants:  * No implants in log *    Operative Findings: The patient was taken to the operating room and transferred to the operating room table in the supine position.  The patient was given general anesthesia and intubated.  Desai catheter was placed.  Abdomen was sterilely prepped and draped.  A small left upper quadrant incision was made.  Abdomen insufflated with a Veress needle.  Abdomen entered with a 5 mm Visiport.  There were inflammatory adhesions to the anterior abdominal wall near the umbilicus.  A 12 mm port was placed in left lower quadrant.  These adhesions were swept away.  A small transverse infraumbilical incision was made.  A 5 mm port was placed here. Abdomen was insufflated.  The patient was put in Trendelenburg position.  Under direct visualization, a 5 mm port was placed in the suprapubic position in the midline.  The patient was then tilted a little to the left and the small bowel was swept away from the right lower quadrant.  The cecum was identified.  The appendix was identified.  It was very inflamed.  It was perforated at mid appendix. I entered into an abscess cavity. Pus suctioned. There were small bowel inflammatory adhesions to the appendix.  Appendix was retracted anteriorly and slowly dissected away from the small bowel. The Ligasure was used to divide mesoappendix.  The appendiceal base was also moderately inflamed.  The cecum was partially mobilized.  The appendix was then transected  along with about a cm or 2 cm of cecum using a laparoscopic stapler.  Three total loads were used.  The appendix was removed using an endocatch bag. The lower quadrants of the abdomen and pelvis were irrigated with suction .  A 10 flat JA was left in the right lower quadrant in the previous abscess cavity.  There was no evidence of any bleeding or leak at the end of the case.  The infraumbilical fascia was closed using Franky Astorga and Ethibond suture.  The left lower quadrant fascia was closed using a Franky Astorga and 0 Vicryl suture.  Instruments were removed.  Ports removed.  The abdomen was deflated.  The skin sites were closed using 4-0 Monocryl.  Patient was extubated and brought to the recovery room in stable condition.     Estimated Blood Loss:25 mL    Estimated Blood Loss has been documented.         Specimens:   Specimen (24h ago, onward)       Start     Ordered    10/28/24 0907  Specimen to Pathology - Surgery  Once        Comments: Pre-op Diagnosis: Acute appendicitis, unspecified acute appendicitis type [K35.80]Procedure(s):APPENDECTOMY, LAPAROSCOPIC Number of specimens: 1Name of specimens: APPENDIX     Question:  Release to patient  Answer:  Immediate    10/28/24 0938                    [id number]

## 2024-10-28 NOTE — BRIEF OP NOTE
[facility]  Brief Operative Note    SUMMARY     Surgery Date: 10/28/2024     Surgeons and Role:     * Dusty French III, MD - Primary    Assisting Surgeon: None    Pre-op Diagnosis:  Acute appendicitis, unspecified acute appendicitis type [K35.80]    Post-op Diagnosis:  Post-Op Diagnosis Codes:     * Acute appendicitis with perforation at the mid appendix, with an abscess, without gangrene    Procedure(s) (LRB):  APPENDECTOMY, LAPAROSCOPIC (N/A) with drainage of right lower quadrant abscess and placement of JA drain    Anesthesia: General    Implants:  * No implants in log *    Operative Findings: The patient was taken to the operating room and transferred to the operating room table in the supine position.  The patient was given general anesthesia and intubated.  Desai catheter was placed.  Abdomen was sterilely prepped and draped.  A small left upper quadrant incision was made.  Abdomen insufflated with a Veress needle.  Abdomen entered with a 5 mm Visiport.  There were inflammatory adhesions to the anterior abdominal wall near the umbilicus.  A 12 mm port was placed in left lower quadrant.  These adhesions were swept away.  A small transverse infraumbilical incision was made.  A 5 mm port was placed here. Abdomen was insufflated.  The patient was put in Trendelenburg position.  Under direct visualization, a 5 mm port was placed in the suprapubic position in the midline.  The patient was then tilted a little to the left and the small bowel was swept away from the right lower quadrant.  The cecum was identified.  The appendix was identified.  It was very inflamed.  It was perforated at mid appendix. I entered into an abscess cavity. Pus suctioned. There were small bowel inflammatory adhesions to the appendix.  Appendix was retracted anteriorly and slowly dissected away from the small bowel. The Ligasure was used to divide mesoappendix.  The appendiceal base was also moderately inflamed.  The  cecum was partially mobilized.  The appendix was then transected along with about a cm or 2 cm of cecum using a laparoscopic stapler.  Three total loads were used.  The appendix was removed using an endocatch bag. The lower quadrants of the abdomen and pelvis were irrigated with suction .  A 10 flat JA was left in the right lower quadrant in the previous abscess cavity.  There was no evidence of any bleeding or leak at the end of the case.  The infraumbilical fascia was closed using Franky Astorga and Ethibond suture.  The left lower quadrant fascia was closed using a Franky Astorga and 0 Vicryl suture.  Instruments were removed.  Ports removed.  The abdomen was deflated.  The skin sites were closed using 4-0 Monocryl.  Patient was extubated and brought to the recovery room in stable condition.     Estimated Blood Loss:25 mL    Estimated Blood Loss has been documented.         Specimens:   Specimen (24h ago, onward)       Start     Ordered    10/28/24 0907  Specimen to Pathology - Surgery  Once        Comments: Pre-op Diagnosis: Acute appendicitis, unspecified acute appendicitis type [K35.80]Procedure(s):APPENDECTOMY, LAPAROSCOPIC Number of specimens: 1Name of specimens: APPENDIX     Question:  Release to patient  Answer:  Immediate    10/28/24 0938                    [id number]

## 2024-10-28 NOTE — CONSULTS
[facility]  Vascular Surgery  Consult Note    Inpatient consult to Vascular Surgery  Consult performed by: Pilar Ernandez MD  Consult ordered by: Dusty French III, MD        Subjective:       History of Present Illness: 59M with history of kidney stones who was admitted with acute appendicitis with associated abscess after recent passing of large kidney stone He is underwent appendectomy with drain placement today with Dr. French. Patient is a smoker, he has cut back to less than a pack a day. He denies any history of abdominal or back pain. He has no symptoms of claudication, rest pain or gangrene.     No medications prior to admission.       Review of patient's allergies indicates:  No Known Allergies    Past Medical History:   Diagnosis Date    Kidney stones      History reviewed. No pertinent surgical history.  Family History    None       Tobacco Use    Smoking status: Every Day     Types: Cigarettes    Smokeless tobacco: Never   Substance and Sexual Activity    Alcohol use: No    Drug use: No    Sexual activity: Yes     Review of Systems  Objective:     Vital Signs (Most Recent):  Temp: 97.3 °F (36.3 °C) (10/28/24 1220)  Pulse: 78 (10/28/24 1220)  Resp: 18 (10/28/24 1220)  BP: 111/75 (10/28/24 1220)  SpO2: (!) 94 % (10/28/24 1219) Vital Signs (24h Range):  Temp:  [97.3 °F (36.3 °C)-98.9 °F (37.2 °C)] 97.3 °F (36.3 °C)  Pulse:  [] 78  Resp:  [16-23] 18  SpO2:  [93 %-98 %] 94 %  BP: (111-131)/(70-82) 111/75     Weight: 103 kg (227 lb 1.2 oz)  Body mass index is 31.67 kg/m².    Date 10/28/24 0700 - 10/29/24 0659   Shift 5075-1428 8667-0860 2760-8589 24 Hour Total   INTAKE   I.V.(mL/kg) 800(7.8)   800(7.8)   IV Piggyback 100   100   Shift Total(mL/kg) 900(8.7)   900(8.7)   OUTPUT   Drains 10   10   Shift Total(mL/kg) 10(0.1)   10(0.1)   Weight (kg) 103 103 103 103       Physical Exam  Constitutional:       Appearance: Normal appearance.   Cardiovascular:      Rate and Rhythm: Normal  rate and regular rhythm.   Pulmonary:      Effort: Pulmonary effort is normal.      Breath sounds: Normal breath sounds.   Musculoskeletal:      Comments: Bilateral femoral pulses are palpable  Palpable right PT pulse  Palpable left DP pulse  Feet are warm and dry, no tissue loss   Skin:     General: Skin is warm and dry.   Neurological:      General: No focal deficit present.      Mental Status: He is alert and oriented to person, place, and time.         Significant Labs:  BMP:   Recent Labs   Lab 10/28/24  0411   *      K 3.8      CO2 27   BUN 17   CREATININE 1.0   CALCIUM 8.5*     CBC:   Recent Labs   Lab 10/28/24  0411   WBC 15.97*   RBC 5.01   HGB 14.9   HCT 45.4      MCV 91   MCH 29.7   MCHC 32.8       Significant Diagnostics:  EXAMINATION:  CT ABDOMEN PELVIS WITH IV CONTRAST     CLINICAL HISTORY:  Right lower quadrant pain;     TECHNIQUE:  Low dose axial images, sagittal and coronal reformations were obtained from the lung bases to the pubic symphysis following the IV administration of 100 mL of Omnipaque 350 .  No oral contrast.     COMPARISON:  None.     FINDINGS:  The visualized lung bases demonstrate streaky bibasilar opacities suggestive of atelectasis and/or scarring.  No pleural fluid.  The visualized portions of the heart and pericardium are within normal limits.     The liver is prominent in size and diffusely hypoattenuating which can be seen with hepatic steatosis.  The main portal vein and splenic vein appear patent.  The spleen and pancreas are unremarkable.  There are bilateral adrenal gland nodules measuring 1.3 cm on the right and 1.7 cm on the left, incompletely characterized on this examination.  Large calcified stone identified in the gallbladder lumen.  No significant intra or extrahepatic biliary ductal dilatation.     The kidneys are normal in size and location and enhance symmetrically.  The right kidney contains multiple nephroliths including the 2.0 cm and  1.6 cm calculus in the renal collecting system.  There are punctate nonobstructing left-sided nephroliths.  Left renal cortical hypodensities which do not demonstrate imaging characteristics of simple fluid.  There is nonspecific bilateral perinephric fat stranding.  The prostate is within normal limits.  There is wall thickening of the urinary bladder which could relate to nondistention, cystitis or chronic outlet obstruction noting mild prostatomegaly.     The abdominal aorta demonstrates a focal region of fusiform ectasia/dilatation in the infrarenal abdominal aorta with Tima centric left lateral hypoattenuation.  Findings could reflect short-segment focal dissection or ulcerated atheromatous plaque.  No retroperitoneal fluid/hemorrhage.     The appendix is dilated and fluid-filled measuring up to 1.8 cm with significant adjacent inflammatory change/stranding.  There is and adjacent 5.4 x 3.7 cm fluid and air containing structure concerning for associated perforation/abscess.  Inflammatory change involving the distal small bowel loops.  There are a few prominent fluid-filled loops of proximal small bowel.  Findings could relate to delayed transit/developing ileus.     The visualized osseous structures are in tact with degenerative change.  Small fat containing umbilical hernia.     Findings were communicated to Dr. Hansa France MD at 05:19 on 10/28/2024 via epic secure chat messenger.     This report was flagged in Epic as abnormal.     Impression:     Findings concerning for acute appendicitis with associated perforation/abscess as detailed above.  Emergent surgical consultation is advised.     Right lower quadrant inflammatory change involves distal small bowel loops noting there are a few prominent/mildly dilated fluid and air-filled loops of proximal small bowel which could relate to delayed transit/developing ileus.     Short segment of focal ectasia/fusiform dilatation of the infrarenal abdominal aorta  with crecentric intraluminal hypoattenuation as detailed above.  Findings could reflect short segment dissection and/or ulcerated atheromatous plaque.     Large renal calculi in the right renal collecting system.  Nonobstructing left nephrolithiasis.     Left renal cortical hypodensities with attenuation values greater than that anticipated for simple fluid.  Further assessment with nonemergent ultrasound advised.     Nonspecific bilateral perinephric fat stranding, circumferential bladder wall thickening which could relate to nondistention, cystitis or outlet obstruction noting mild prostatomegaly.  Correlation with urinalysis advised.     Hepatomegaly and findings suggestive of hepatic steatosis.  Correlation with LFTs advised.    Assessment/Plan:   59M admitted for ruptured appendicitis with incidental finding of what appears to be an ulcerated plaque of the abdominal aorta. Imaging performed with CT with IV contrast, not CTA. Patient has no apparent associated symptoms and the lesion is not flow limiting.   - Recommend patient take aspirin 81mg   - Outpatient follow-up with CTA abdomen/pelvis in 3 months    Active Diagnoses:    Diagnosis Date Noted POA    PRINCIPAL PROBLEM:  Acute appendicitis with perforation, generalized peritonitis, and abscess, without gangrene [K35.211] 10/28/2024 Yes    Appendicitis [K37] 10/28/2024 Unknown    Hypocalcemia [E83.51] 10/28/2024 Unknown    Atherosclerotic plaque [I70.90] 10/28/2024 Unknown    Class 2 obesity with body mass index (BMI) of 35.0 to 35.9 in adult [E66.812, Z68.35] 08/26/2022 Not Applicable    Smoking [F17.200] 08/26/2022 Yes      Problems Resolved During this Admission:       Thank you for your consult. I will sign off. Please contact us if you have any additional questions.    Pilar Ernandez MD  Vascular Surgery  [facility]

## 2024-10-28 NOTE — SUBJECTIVE & OBJECTIVE
History reviewed. No pertinent past medical history.    History reviewed. No pertinent surgical history.    Review of patient's allergies indicates:  No Known Allergies    No current facility-administered medications on file prior to encounter.     Current Outpatient Medications on File Prior to Encounter   Medication Sig    ciclopirox (LOPROX) 0.77 % Crea Apply topically 2 (two) times daily. Apply to fungal rash on arms for 4-6 weeks    pravastatin (PRAVACHOL) 20 MG tablet Take 1 tablet (20 mg total) by mouth once daily.     Family History    None       Tobacco Use    Smoking status: Every Day    Smokeless tobacco: Never   Substance and Sexual Activity    Alcohol use: No    Drug use: No    Sexual activity: Yes     Review of Systems   Constitutional:  Negative for activity change, appetite change and diaphoresis.   HENT:  Negative for congestion, facial swelling and sinus pressure.    Respiratory:  Negative for shortness of breath and wheezing.    Cardiovascular:  Negative for chest pain and palpitations.   Gastrointestinal:  Positive for abdominal distention and abdominal pain.   Genitourinary:  Negative for dysuria.   Skin:  Negative for color change and pallor.   Neurological:  Negative for dizziness, facial asymmetry, speech difficulty and headaches.   Psychiatric/Behavioral:  Negative for agitation and confusion.      Objective:     Vital Signs (Most Recent):  Temp: 98.9 °F (37.2 °C) (10/28/24 0312)  Pulse: 100 (10/28/24 0630)  Resp: 18 (10/28/24 0630)  BP: 131/80 (10/28/24 0630)  SpO2: 96 % (10/28/24 0630) Vital Signs (24h Range):  Temp:  [98.9 °F (37.2 °C)] 98.9 °F (37.2 °C)  Pulse:  [] 100  Resp:  [16-20] 18  SpO2:  [95 %-96 %] 96 %  BP: (126-131)/(74-82) 131/80     Weight: 104.3 kg (230 lb)  Body mass index is 33.97 kg/m².     Physical Exam  Constitutional:       General: He is not in acute distress.     Appearance: He is well-developed.   HENT:      Head: Normocephalic.   Eyes:      Pupils: Pupils  are equal, round, and reactive to light.   Cardiovascular:      Rate and Rhythm: Normal rate and regular rhythm.   Pulmonary:      Effort: No respiratory distress.   Abdominal:      General: There is distension.      Tenderness: There is abdominal tenderness.   Musculoskeletal:         General: Normal range of motion.      Cervical back: Neck supple.   Skin:     General: Skin is warm.      Findings: No rash.   Neurological:      Mental Status: He is alert and oriented to person, place, and time.   Psychiatric:         Mood and Affect: Mood normal.              CRANIAL NERVES     CN III, IV, VI   Pupils are equal, round, and reactive to light.       Significant Labs: All pertinent labs within the past 24 hours have been reviewed.  BMP:   Recent Labs   Lab 10/28/24  0411   *      K 3.8      CO2 27   BUN 17   CREATININE 1.0   CALCIUM 8.5*     CBC:   Recent Labs   Lab 10/28/24  0411   WBC 15.97*   HGB 14.9   HCT 45.4        CMP:   Recent Labs   Lab 10/28/24  0411      K 3.8      CO2 27   *   BUN 17   CREATININE 1.0   CALCIUM 8.5*   PROT 7.1   ALBUMIN 3.7   BILITOT 1.0   ALKPHOS 50*   AST 10   ALT 16   ANIONGAP 8       Significant Imaging: I have reviewed all pertinent imaging results/findings within the past 24 hours.

## 2024-10-28 NOTE — CONSULTS
Replaced by [facility]  General Surgery  Consult Note    Consults  Subjective:     Chief Complaint/Reason for Admission:  Acute perforated appendicitis    History of Present Illness:  Patient is 59-year-old male admitted this morning with acute appendicitis with evidence of perforation.  He reports only 24 hours of lower abdominal pain worse in the right lower quadrant associated with nausea.  Presented through the emergency department.  Hemodynamically stable.  White count elevated to 16.  CT scan showed dilated inflamed appendix measuring 1.8 cm in diameter.  There is an adjacent air and fluid collection consistent with perforation.  Patient is awake and alert.  Reports no subjective fevers or chills.  No previous abdominal operations.  No allergies.    No current facility-administered medications on file prior to encounter.     Current Outpatient Medications on File Prior to Encounter   Medication Sig    ciclopirox (LOPROX) 0.77 % Crea Apply topically 2 (two) times daily. Apply to fungal rash on arms for 4-6 weeks    pravastatin (PRAVACHOL) 20 MG tablet Take 1 tablet (20 mg total) by mouth once daily.       Review of patient's allergies indicates:  No Known Allergies    Past Medical History:   Diagnosis Date    Kidney stones      History reviewed. No pertinent surgical history.  Family History    None       Tobacco Use    Smoking status: Every Day     Types: Cigarettes    Smokeless tobacco: Never   Substance and Sexual Activity    Alcohol use: No    Drug use: No    Sexual activity: Yes     Review of Systems   Constitutional:  Negative for appetite change, chills, fever and unexpected weight change.   HENT:  Negative for hearing loss, rhinorrhea, sore throat and voice change.    Eyes:  Negative for photophobia and visual disturbance.   Respiratory:  Negative for cough, choking and shortness of breath.    Cardiovascular:  Negative for chest pain, palpitations and leg swelling.   Gastrointestinal:  Positive for  abdominal distention, abdominal pain and nausea. Negative for blood in stool, constipation, diarrhea and vomiting.   Endocrine: Negative for cold intolerance, heat intolerance, polydipsia and polyuria.   Musculoskeletal:  Negative for arthralgias, back pain, joint swelling and neck stiffness.   Skin:  Negative for color change, pallor and rash.   Neurological:  Negative for dizziness, seizures, syncope and headaches.   Hematological:  Negative for adenopathy. Does not bruise/bleed easily.   Psychiatric/Behavioral:  Negative for agitation, behavioral problems and confusion.      Objective:     Vital Signs (Most Recent):  Temp: 98.9 °F (37.2 °C) (10/28/24 0312)  Pulse: 100 (10/28/24 0630)  Resp: 18 (10/28/24 0630)  BP: 131/80 (10/28/24 0630)  SpO2: 96 % (10/28/24 0630) Vital Signs (24h Range):  Temp:  [98.9 °F (37.2 °C)] 98.9 °F (37.2 °C)  Pulse:  [] 100  Resp:  [16-20] 18  SpO2:  [95 %-96 %] 96 %  BP: (126-131)/(74-82) 131/80     Weight: 104.3 kg (230 lb)  Body mass index is 33.97 kg/m².    No intake or output data in the 24 hours ending 10/28/24 0743    Physical Exam  Constitutional:       General: He is awake. He is not in acute distress.     Appearance: Normal appearance. He is well-developed. He is obese. He is not toxic-appearing.   Pulmonary:      Effort: No tachypnea or bradypnea.   Abdominal:      General: There is no distension.      Palpations: Abdomen is soft.      Tenderness: There is abdominal tenderness in the right lower quadrant, periumbilical area and suprapubic area. There is guarding and rebound.   Skin:     Coloration: Skin is not jaundiced.   Neurological:      Mental Status: He is alert and oriented to person, place, and time.   Psychiatric:         Behavior: Behavior is cooperative.         Significant Labs:  CBC:   Recent Labs   Lab 10/28/24  0411   WBC 15.97*   RBC 5.01   HGB 14.9   HCT 45.4      MCV 91   MCH 29.7   MCHC 32.8     CMP:   Recent Labs   Lab 10/28/24  0411   *    CALCIUM 8.5*   ALBUMIN 3.7   PROT 7.1      K 3.8   CO2 27      BUN 17   CREATININE 1.0   ALKPHOS 50*   ALT 16   AST 10   BILITOT 1.0       Significant Diagnostics:  I have reviewed all pertinent imaging results/findings within the past 24 hours.    Assessment/Plan:     Active Diagnoses:    Diagnosis Date Noted POA    PRINCIPAL PROBLEM:  Acute Appendicitis with perforation and abscess without gangrene 10/28/2024 Unknown    Hypocalcemia [E83.51] 10/28/2024 Unknown    Atherosclerotic plaque [I70.90] 10/28/2024 Unknown    Class 2 obesity with body mass index (BMI) of 35.0 to 35.9 in adult [E66.812, Z68.35] 08/26/2022 Not Applicable    Smoking [F17.200] 08/26/2022 Yes      Problems Resolved During this Admission:   Patient with severe acute appendicitis with evidence of perforation.  Abscess not well formed.  Also not amenable to percutaneous drainage.  Will go the operating room for laparoscopy, possible laparotomy and appendectomy.  Fluid collection will be washed out and most likely JA drain will be placed.  Risks and benefits of procedure discussed with the patient.  Risks include but are not limited to open procedure with partial colectomy.  High-risk for postoperative infection.  He has been started on Zosyn IV.    Thank you for your consult.     Dusty French III, MD  General Surgery  [facility]

## 2024-10-28 NOTE — CARE UPDATE
10/28/24 1219   Patient Assessment/Suction   Level of Consciousness (AVPU) alert   Respiratory Effort Normal;Unlabored   All Lung Fields Breath Sounds equal bilaterally   Rhythm/Pattern, Respiratory unlabored;pattern regular;depth regular   PRE-TX-O2   Device (Oxygen Therapy) nasal cannula   $ Is the patient on Low Flow Oxygen? Yes   Flow (L/min) (Oxygen Therapy) 2   SpO2 (!) 94 %   Pulse Oximetry Type Continuous   $ Pulse Oximetry - Multiple Charge Pulse Oximetry - Multiple   Pulse 97   Resp 17   Incentive Spirometer   $ Incentive Spirometer Charges done with encouragement   Incentive Spirometer Predicted Level (mL) 2500   Administration (IS) instruction provided, initial   Number of Repetitions (IS) 5   Level Incentive Spirometer (mL) 2500   Patient Tolerance (IS) good   Education   $ Education 15 min;Incentive Spirometry   Tobacco Cessation Intervention   Do you use any type of tobacco product? Yes   Are you interested in quitting use of tobacco products? Not interested   Respiratory Evaluation   $ Care Plan Tech Time 15 min   $ Respiratory Evaluation Complete   Evaluation For New Orders   Admitting Diagnosis appendicitis   Home Oxygen   Has Home Oxygen? No   Home Aerosol, MDI, DPI, and Other Treatments/Therapies   Home Respiratory Therapy Per Patient/Review of Chart No   Oxygen Care Plan   Rationale SpO2 is <92% (Non-cardiac Pt.)   Bronchodilator Care Plan   Rationale No Rationale found   Atelectasis Care Plan   Atelectasis Care Plan Incentive Spiromentry   Frequency TID   I.S. Goal (ml) 2500 ml   I.S. Minimum (ml) 1250 ml   Rationale Post-op abdominal   Airway Clearance Care Plan   Rationale No rationale found

## 2024-10-28 NOTE — ASSESSMENT & PLAN NOTE
Body mass index is 33.97 kg/m². Morbid obesity complicates all aspects of disease management from diagnostic modalities to treatment. Weight loss encouraged and health benefits explained to patient.

## 2024-10-28 NOTE — CARE UPDATE
Patient seen and examined.  Patient underwent laparoscopic appendectomy by Dr. French this morning.  A JA drain has been in place.  Patient reports adequate pain control.  Currently afebrile.  Receiving intravenous Zosyn antibiotic therapy.  Patient will receive clear liquids for now.  Will follow General surgery recommendations.  Maintain patient on gastric ulcer prophylaxis and deep venous thrombosis prophylaxis during this hospitalization.

## 2024-10-28 NOTE — H&P
[facility] - Emergency Dept  Hospital Medicine  History & Physical    Patient Name: Landry Hartmann  MRN: [id number]  Patient Class: OP- Observation  Admission Date: 10/28/2024  Attending Physician: Jamin Faustin MD   Primary Care Provider: Antonio Devine MD         Patient information was obtained from patient, spouse/SO, and ER records.     Subjective:     Principal Problem:Appendicitis    Chief Complaint:   Chief Complaint   Patient presents with    Abdominal Pain     STARTED SATURDAY EVENING.         HPI:  59-year-old male chronic smoker , kidney stone problem came with right-sided abdominal pain.  He recently passed a kidney stone.  Initially he thought pain from the kidney stone.  But his  stomach has been rumbling and decreased appetite and abdominal pain started in the right iliac fossa area and gradually moved to Marley umbilical area and came to the hospital.  CT scan of the abdomen was done and found to have Findings concerning for acute appendicitis with associated perforation/abscess and admitted.  Other abnormal findings are also noted.  Surgeon was consulted from ER    History reviewed. No pertinent past medical history.    History reviewed. No pertinent surgical history.    Review of patient's allergies indicates:  No Known Allergies    No current facility-administered medications on file prior to encounter.     Current Outpatient Medications on File Prior to Encounter   Medication Sig    ciclopirox (LOPROX) 0.77 % Crea Apply topically 2 (two) times daily. Apply to fungal rash on arms for 4-6 weeks    pravastatin (PRAVACHOL) 20 MG tablet Take 1 tablet (20 mg total) by mouth once daily.     Family History    None       Tobacco Use    Smoking status: Every Day    Smokeless tobacco: Never   Substance and Sexual Activity    Alcohol use: No    Drug use: No    Sexual activity: Yes     Review of Systems   Constitutional:  Negative for activity change, appetite change and diaphoresis.   HENT:   Negative for congestion, facial swelling and sinus pressure.    Respiratory:  Negative for shortness of breath and wheezing.    Cardiovascular:  Negative for chest pain and palpitations.   Gastrointestinal:  Positive for abdominal distention and abdominal pain.   Genitourinary:  Negative for dysuria.   Skin:  Negative for color change and pallor.   Neurological:  Negative for dizziness, facial asymmetry, speech difficulty and headaches.   Psychiatric/Behavioral:  Negative for agitation and confusion.      Objective:     Vital Signs (Most Recent):  Temp: 98.9 °F (37.2 °C) (10/28/24 0312)  Pulse: 100 (10/28/24 0630)  Resp: 18 (10/28/24 0630)  BP: 131/80 (10/28/24 0630)  SpO2: 96 % (10/28/24 0630) Vital Signs (24h Range):  Temp:  [98.9 °F (37.2 °C)] 98.9 °F (37.2 °C)  Pulse:  [] 100  Resp:  [16-20] 18  SpO2:  [95 %-96 %] 96 %  BP: (126-131)/(74-82) 131/80     Weight: 104.3 kg (230 lb)  Body mass index is 33.97 kg/m².     Physical Exam  Constitutional:       General: He is not in acute distress.     Appearance: He is well-developed.   HENT:      Head: Normocephalic.   Eyes:      Pupils: Pupils are equal, round, and reactive to light.   Cardiovascular:      Rate and Rhythm: Normal rate and regular rhythm.   Pulmonary:      Effort: No respiratory distress.   Abdominal:      General: There is distension.      Tenderness: There is abdominal tenderness.   Musculoskeletal:         General: Normal range of motion.      Cervical back: Neck supple.   Skin:     General: Skin is warm.      Findings: No rash.   Neurological:      Mental Status: He is alert and oriented to person, place, and time.   Psychiatric:         Mood and Affect: Mood normal.              CRANIAL NERVES     CN III, IV, VI   Pupils are equal, round, and reactive to light.       Significant Labs: All pertinent labs within the past 24 hours have been reviewed.  BMP:   Recent Labs   Lab 10/28/24  0411   *      K 3.8      CO2 27   BUN 17    CREATININE 1.0   CALCIUM 8.5*     CBC:   Recent Labs   Lab 10/28/24  0411   WBC 15.97*   HGB 14.9   HCT 45.4        CMP:   Recent Labs   Lab 10/28/24  0411      K 3.8      CO2 27   *   BUN 17   CREATININE 1.0   CALCIUM 8.5*   PROT 7.1   ALBUMIN 3.7   BILITOT 1.0   ALKPHOS 50*   AST 10   ALT 16   ANIONGAP 8       Significant Imaging: I have reviewed all pertinent imaging results/findings within the past 24 hours.  Assessment/Plan:     * Appendicitis    Possible perforation/abscess  NPO  IV fluid  IV antibiotic with Zosyn  Surgery consulted    Atherosclerotic plaque  Short segment of focal ectasia/fusiform dilatation of the infrarenal abdominal aorta with crecentric intraluminal hypoattenuation as detailed above. Findings could reflect short segment dissection and/or ulcerated atheromatous plaque.     Vascular surgery consultation for the opinion      Hypocalcemia  Replace      Smoking  Counseled for cessation      Class 2 obesity with body mass index (BMI) of 35.0 to 35.9 in adult  Body mass index is 33.97 kg/m². Morbid obesity complicates all aspects of disease management from diagnostic modalities to treatment. Weight loss encouraged and health benefits explained to patient.         Kidney stones problem       Recently passed, nonobstructive, follow up outpatient  VTE Risk Mitigation (From admission, onward)           Ordered     IP VTE HIGH RISK PATIENT  Once         10/28/24 0625     Place sequential compression device  Until discontinued         10/28/24 0625     Place sequential compression device  Until discontinued         10/28/24 0617                       On 10/28/2024, patient should be placed in hospital observation services under my care.             Jamin Faustin MD  Department of Hospital Medicine  [facility] - Emergency Dept

## 2024-10-28 NOTE — NURSING
Patient arrived to floor admission assessment complete, VSS, Patient oriented to room, floor, call light. Patient in bed, bed alarm activated, call light in reach., SCD's in place, patient instructed on IS

## 2024-10-28 NOTE — HPI
59-year-old male chronic smoker , kidney stone problem came with right-sided abdominal pain.  He recently passed a kidney stone.  Initially he thought pain from the kidney stone.  But his  stomach has been rumbling and decreased appetite and abdominal pain started in the right iliac fossa area and gradually moved to Marley umbilical area and came to the hospital.  CT scan of the abdomen was done and found to have Findings concerning for acute appendicitis with associated perforation/abscess and admitted.  Other abnormal findings are also noted.  Surgeon was consulted from ER

## 2024-10-28 NOTE — ED PROVIDER NOTES
Encounter Date: 10/28/2024       History     Chief Complaint   Patient presents with    Abdominal Pain     STARTED SATURDAY EVENING.      Chief complaint is abdominal pain.  The patient does have a history of kidney stones.  This does not feel like his typical kidney stone pain.  He has been ill since Saturday with slight constipation.  He has stomach has been rumbling.  He has had nausea decreased appetite.  He describes the pain as stabbing.  It is primarily umbilical and right lower quadrant.        Review of patient's allergies indicates:  No Known Allergies  Past Medical History:   Diagnosis Date    Kidney stones      History reviewed. No pertinent surgical history.  No family history on file.  Social History     Tobacco Use    Smoking status: Every Day     Types: Cigarettes    Smokeless tobacco: Never   Substance Use Topics    Alcohol use: No    Drug use: No     Review of Systems   Constitutional:  Negative for chills and fever.   HENT:  Negative for ear pain, rhinorrhea and sore throat.    Eyes:  Negative for pain and visual disturbance.   Respiratory:  Negative for cough and shortness of breath.    Cardiovascular:  Negative for chest pain and palpitations.   Gastrointestinal:  Positive for abdominal pain. Negative for constipation, diarrhea, nausea and vomiting.   Genitourinary:  Negative for dysuria, frequency, hematuria and urgency.   Musculoskeletal:  Negative for back pain, joint swelling and myalgias.   Skin:  Negative for rash.   Neurological:  Negative for dizziness, seizures, weakness and headaches.   Psychiatric/Behavioral:  Negative for dysphoric mood. The patient is not nervous/anxious.        Physical Exam     Initial Vitals [10/28/24 0312]   BP Pulse Resp Temp SpO2   126/82 105 16 98.9 °F (37.2 °C) 95 %      MAP       --         Physical Exam    Nursing note and vitals reviewed.  Constitutional: He appears well-developed and well-nourished.   HENT:   Head: Normocephalic and atraumatic.   Eyes:  Conjunctivae, EOM and lids are normal. Pupils are equal, round, and reactive to light.   Neck: Trachea normal. Neck supple. No thyroid mass present.   Cardiovascular:  Normal rate, regular rhythm and normal heart sounds.           Pulmonary/Chest: Breath sounds normal. No respiratory distress.   Abdominal: Abdomen is soft. There is no abdominal tenderness.   Bowel sounds hypoactive, patient with tenderness and periumbilical area right lower quadrant.   Genitourinary:    Penis normal.      Genitourinary Comments: No right inguinal hernia no testicular pain     Musculoskeletal:         General: Normal range of motion.      Cervical back: Neck supple.     Neurological: He is alert and oriented to person, place, and time. He has normal strength and normal reflexes. No cranial nerve deficit or sensory deficit.   Skin: Skin is warm and dry.   Psychiatric: He has a normal mood and affect. His speech is normal and behavior is normal. Judgment and thought content normal.         ED Course   Procedures  Labs Reviewed   CBC W/ AUTO DIFFERENTIAL - Abnormal       Result Value    WBC 15.97 (*)     RBC 5.01      Hemoglobin 14.9      Hematocrit 45.4      MCV 91      MCH 29.7      MCHC 32.8      RDW 13.7      Platelets 230      MPV 11.5      Immature Granulocytes 0.4      Gran # (ANC) 12.2 (*)     Immature Grans (Abs) 0.06 (*)     Lymph # 1.9      Mono # 1.7 (*)     Eos # 0.1      Baso # 0.03      nRBC 0      Gran % 76.5 (*)     Lymph % 11.8 (*)     Mono % 10.3      Eosinophil % 0.8      Basophil % 0.2      Differential Method Automated     COMPREHENSIVE METABOLIC PANEL - Abnormal    Sodium 138      Potassium 3.8      Chloride 103      CO2 27      Glucose 131 (*)     BUN 17      Creatinine 1.0      Calcium 8.5 (*)     Total Protein 7.1      Albumin 3.7      Total Bilirubin 1.0      Alkaline Phosphatase 50 (*)     AST 10      ALT 16      eGFR >60.0      Anion Gap 8     LIPASE    Lipase 29     ISTAT CREATININE    POC Creatinine 1.0       Sample VENOUS     POCT CREATININE          Imaging Results               CT Abdomen Pelvis With IV Contrast NO Oral Contrast (Final result)  Result time 10/28/24 05:30:51      Final result by Radha Grossman MD (10/28/24 05:30:51)                   Impression:      Findings concerning for acute appendicitis with associated perforation/abscess as detailed above.  Emergent surgical consultation is advised.    Right lower quadrant inflammatory change involves distal small bowel loops noting there are a few prominent/mildly dilated fluid and air-filled loops of proximal small bowel which could relate to delayed transit/developing ileus.    Short segment of focal ectasia/fusiform dilatation of the infrarenal abdominal aorta with crecentric intraluminal hypoattenuation as detailed above.  Findings could reflect short segment dissection and/or ulcerated atheromatous plaque.    Large renal calculi in the right renal collecting system.  Nonobstructing left nephrolithiasis.    Left renal cortical hypodensities with attenuation values greater than that anticipated for simple fluid.  Further assessment with nonemergent ultrasound advised.    Nonspecific bilateral perinephric fat stranding, circumferential bladder wall thickening which could relate to nondistention, cystitis or outlet obstruction noting mild prostatomegaly.  Correlation with urinalysis advised.    Hepatomegaly and findings suggestive of hepatic steatosis.  Correlation with LFTs advised.      Electronically signed by: Radha Grossman MD  Date:    10/28/2024  Time:    05:30               Narrative:    EXAMINATION:  CT ABDOMEN PELVIS WITH IV CONTRAST    CLINICAL HISTORY:  Right lower quadrant pain;    TECHNIQUE:  Low dose axial images, sagittal and coronal reformations were obtained from the lung bases to the pubic symphysis following the IV administration of 100 mL of Omnipaque 350 .  No oral contrast.    COMPARISON:  None.    FINDINGS:  The visualized lung bases  demonstrate streaky bibasilar opacities suggestive of atelectasis and/or scarring.  No pleural fluid.  The visualized portions of the heart and pericardium are within normal limits.    The liver is prominent in size and diffusely hypoattenuating which can be seen with hepatic steatosis.  The main portal vein and splenic vein appear patent.  The spleen and pancreas are unremarkable.  There are bilateral adrenal gland nodules measuring 1.3 cm on the right and 1.7 cm on the left, incompletely characterized on this examination.  Large calcified stone identified in the gallbladder lumen.  No significant intra or extrahepatic biliary ductal dilatation.    The kidneys are normal in size and location and enhance symmetrically.  The right kidney contains multiple nephroliths including the 2.0 cm and 1.6 cm calculus in the renal collecting system.  There are punctate nonobstructing left-sided nephroliths.  Left renal cortical hypodensities which do not demonstrate imaging characteristics of simple fluid.  There is nonspecific bilateral perinephric fat stranding.  The prostate is within normal limits.  There is wall thickening of the urinary bladder which could relate to nondistention, cystitis or chronic outlet obstruction noting mild prostatomegaly.    The abdominal aorta demonstrates a focal region of fusiform ectasia/dilatation in the infrarenal abdominal aorta with Tima centric left lateral hypoattenuation.  Findings could reflect short-segment focal dissection or ulcerated atheromatous plaque.  No retroperitoneal fluid/hemorrhage.    The appendix is dilated and fluid-filled measuring up to 1.8 cm with significant adjacent inflammatory change/stranding.  There is and adjacent 5.4 x 3.7 cm fluid and air containing structure concerning for associated perforation/abscess.  Inflammatory change involving the distal small bowel loops.  There are a few prominent fluid-filled loops of proximal small bowel.  Findings could relate  to delayed transit/developing ileus.    The visualized osseous structures are in tact with degenerative change.  Small fat containing umbilical hernia.    Findings were communicated to Dr. Hansa France MD at 05:19 on 10/28/2024 via epic secure chat messenger.    This report was flagged in Epic as abnormal.                                       Medications   sodium chloride 0.9% flush 10 mL (has no administration in time range)   glucose chewable tablet 16 g (has no administration in time range)   glucose chewable tablet 24 g (has no administration in time range)   dextrose 50% injection 12.5 g (has no administration in time range)   dextrose 50% injection 25 g (has no administration in time range)   glucagon (human recombinant) injection 1 mg (has no administration in time range)   piperacillin-tazobactam 3.375 g in dextrose 5 % 100 mL IVPB (ready to mix) (3.375 g Intravenous New Bag 10/29/24 0612)   morphine injection 2 mg (2 mg Intravenous Given 10/29/24 0305)   sodium chloride 0.9% flush 10 mL (has no administration in time range)   naloxone 0.4 mg/mL injection 0.02 mg (has no administration in time range)   glucose chewable tablet 16 g (has no administration in time range)   glucose chewable tablet 24 g (has no administration in time range)   dextrose 50% injection 12.5 g (has no administration in time range)   dextrose 50% injection 25 g (has no administration in time range)   glucagon (human recombinant) injection 1 mg (has no administration in time range)   potassium chloride 10 mEq in 100 mL IVPB (40 mEq Intravenous New Bag 10/28/24 1941)     And   potassium chloride 10 mEq in 100 mL IVPB (has no administration in time range)     And   potassium chloride 10 mEq in 100 mL IVPB (has no administration in time range)   magnesium sulfate 2g in water 50mL IVPB (premix) (has no administration in time range)   magnesium sulfate 2g in water 50mL IVPB (premix) (has no administration in time range)   sodium  phosphate 15 mmol in D5W 250 mL IVPB (has no administration in time range)   sodium phosphate 20.01 mmol in D5W 250 mL IVPB (has no administration in time range)   sodium phosphate 30 mmol in D5W 250 mL IVPB (has no administration in time range)   calcium gluconate 1 g in NS IVPB (premixed) (has no administration in time range)   calcium gluconate 1 g in NS IVPB (premixed) (has no administration in time range)   calcium gluconate 1 g in NS IVPB (premixed) (has no administration in time range)   mupirocin 2 % ointment 1 g (1 g Nasal Given 10/28/24 2005)   HYDROcodone-acetaminophen 5-325 mg per tablet 1 tablet (1 tablet Oral Given 10/29/24 0109)   0.9%  NaCl infusion ( Intravenous New Bag 10/29/24 0058)   enoxaparin injection 40 mg (40 mg Subcutaneous Given 10/28/24 1608)   ondansetron disintegrating tablet 8 mg (has no administration in time range)   pantoprazole EC tablet 40 mg (40 mg Oral Given 10/29/24 0612)   iohexoL (OMNIPAQUE 350) injection 100 mL (100 mLs Intravenous Given 10/28/24 0446)   piperacillin-tazobactam 3.375 g in dextrose 5 % 100 mL IVPB (ready to mix) (0 g Intravenous Stopped 10/28/24 0725)     Medical Decision Making  Patient with right lower quadrant pain differential diagnosis includes appendicitis versus colitis among others.  The patient by CT scan however shows acute appendicitis with probable abscess formation .  Case discussed with the hospitalist patient admitted for appendectomy. Hansa France MD  6:29 AM 10/29/2024          Amount and/or Complexity of Data Reviewed  Labs: ordered.  Radiology: ordered.    Risk  Prescription drug management.                                      Clinical Impression:  Final diagnoses:  [R10.9] Abdominal pain          ED Disposition Condition    Observation                 Hansa France MD  10/29/24 6008

## 2024-10-28 NOTE — PLAN OF CARE
Formerly [facility]  Initial Discharge Assessment       Primary Care Provider: Antonio Devine MD    Admission Diagnosis: Abdominal pain [R10.9]  Acute appendicitis with perforation, generalized peritonitis, and abscess [K35.211]    Admission Date: 10/28/2024  Expected Discharge Date: 10/30/2024    Transition of Care Barriers: (P) None     met with Pt at bedside to complete an initial discharge assessment. Pt AAOx4s.  Demographics, PCP, and insurance verified. Pt has No dialysis. Pt reports ability to complete ADLs without assistance. Pt verbalized plan to discharge home via family transport.     Pt has no other needs to be addressed at this time.     Payor: [employer] / Plan: [facility] CHOICE PLUS / Product Type: Commercial /     Extended Emergency Contact Information  Primary Emergency Contact: matt austin   [place]  Mobile Phone: [phone]  Relation: Spouse  Preferred language: English   needed? No    Discharge Plan A: (P) Home with family  Discharge Plan B: (P) Home with family      [id number] - [place] - [address]  [address]  [facility] [address]  Phone: [phone] Fax: [phone]    [id number] - [place] - [address]  [address]  [facility] [address]  Phone: [phone] Fax: [phone]      Initial Assessment (most recent)       Adult Discharge Assessment - 10/28/24 1453          Discharge Assessment    Assessment Type Discharge Planning Assessment (P)      Confirmed/corrected address, phone number and insurance Yes (P)      Confirmed Demographics Correct on Facesheet (P)      Source of Information patient (P)      When was your last doctors appointment? -- (P)    unknown    Reason For Admission Acute appendicitis with perforation, generalized peritonitis, and abscess, without gangrene (P)      People in Home spouse;child(solitario), adult (P)      Facility Arrived From: Home (P)      Do you expect to return to  your current living situation? Yes (P)      Do you have help at home or someone to help you manage your care at home? Yes (P)      Who are your caregiver(s) and their phone number(s)? matt austin (Spouse)  [phone] (Mobile) (P)      Prior to hospitilization cognitive status: Unable to Assess (P)      Current cognitive status: Alert/Oriented (P)      Walking or Climbing Stairs Difficulty no (P)      Dressing/Bathing Difficulty no (P)      Home Accessibility not wheelchair accessible (P)      Home Layout Able to live on 1st floor (P)      Equipment Currently Used at Home none (P)      Readmission within 30 days? No (P)      Patient currently being followed by outpatient case management? No (P)      Do you currently have service(s) that help you manage your care at home? No (P)      Do you take prescription medications? No (P)      Do you have prescription coverage? Yes (P)      Coverage [facility] CHOICE PLUS - (P)      Who is going to help you get home at discharge? matt austin (Spouse)  [phone] (Mobile) (P)      How do you get to doctors appointments? car, drives self (P)      Are you on dialysis? No (P)      Do you take coumadin? No (P)      Discharge Plan A Home with family (P)      Discharge Plan B Home with family (P)      DME Needed Upon Discharge  none (P)      Discharge Plan discussed with: Patient;Adult children (P)      Transition of Care Barriers None (P)         Physical Activity    On average, how many days per week do you engage in moderate to strenuous exercise (like a brisk walk)? 5 days (P)         Financial Resource Strain    How hard is it for you to pay for the very basics like food, housing, medical care, and heating? Not hard at all (P)         Housing Stability    In the last 12 months, was there a time when you were not able to pay the mortgage or rent on time? No (P)      At any time in the past 12 months, were you homeless or living in a shelter (including now)?  No (P)         Transportation Needs    Has the lack of transportation kept you from medical appointments, meetings, work or from getting things needed for daily living? No (P)         Food Insecurity    Within the past 12 months, you worried that your food would run out before you got the money to buy more. Never true (P)      Within the past 12 months, the food you bought just didn't last and you didn't have money to get more. Never true (P)         Stress    Do you feel stress - tense, restless, nervous, or anxious, or unable to sleep at night because your mind is troubled all the time - these days? Not at all (P)         Social Isolation    How often do you feel lonely or isolated from those around you?  Never (P)         Alcohol Use    Q1: How often do you have a drink containing alcohol? Never (P)      Q2: How many drinks containing alcohol do you have on a typical day when you are drinking? Patient does not drink (P)      Q3: How often do you have six or more drinks on one occasion? Never (P)         Utilities    In the past 12 months has the electric, gas, oil, or water company threatened to shut off services in your home? No (P)         Health Literacy    How often do you need to have someone help you when you read instructions, pamphlets, or other written material from your doctor or pharmacy? Never (P)         OTHER    Name(s) of People in Home matt austin (Spouse)  [phone] (Mobile) (P)

## 2024-10-28 NOTE — SUBJECTIVE & OBJECTIVE
Subjective:  Patient seen and examined.  Status post laparoscopic appendectomy.  Patient is in good spirits.  Reports adequate pain control.  Tolerating clear liquids.  Discussed with Dr. French.  No nausea vomiting, fever, chest pain or shortness of breath noted.    Review of Systems   Constitutional:  Negative for chills and fever.   HENT:  Negative for congestion and sore throat.    Eyes:  Negative for visual disturbance.   Respiratory:  Negative for cough and shortness of breath.    Cardiovascular:  Negative for chest pain and palpitations.   Gastrointestinal:  Positive for abdominal pain and nausea. Negative for constipation, diarrhea and vomiting.   Endocrine: Negative for cold intolerance and heat intolerance.   Genitourinary:  Negative for dysuria and hematuria.   Musculoskeletal:  Negative for arthralgias and myalgias.   Skin:  Negative for rash.   Neurological:  Negative for tremors and seizures.   Hematological:  Negative for adenopathy. Does not bruise/bleed easily.     Objective:     Vital Signs (Most Recent):  Temp: 98.3 °F (36.8 °C) (10/28/24 1530)  Pulse: 96 (10/28/24 1530)  Resp: 16 (10/28/24 1530)  BP: 107/72 (10/28/24 1530)  SpO2: 95 % (10/28/24 1530) Vital Signs (24h Range):  Temp:  [97.3 °F (36.3 °C)-98.9 °F (37.2 °C)] 98.3 °F (36.8 °C)  Pulse:  [] 96  Resp:  [16-23] 16  SpO2:  [93 %-98 %] 95 %  BP: (107-131)/(70-82) 107/72     Weight: 103 kg (227 lb 1.2 oz)  Body mass index is 31.67 kg/m².     Physical Exam  Vitals and nursing note reviewed.   Constitutional:       Appearance: Normal appearance. He is well-developed.   HENT:      Head: Normocephalic and atraumatic.      Nose: Nose normal. No septal deviation.   Eyes:      Conjunctiva/sclera: Conjunctivae normal.      Pupils: Pupils are equal, round, and reactive to light.   Neck:      Thyroid: No thyroid mass.      Vascular: No JVD.      Trachea: No tracheal tenderness or tracheal deviation.   Cardiovascular:      Rate and Rhythm: Normal  rate and regular rhythm.      Heart sounds: S1 normal and S2 normal. No murmur heard.     No friction rub. No gallop.   Pulmonary:      Effort: Pulmonary effort is normal.      Breath sounds: Normal breath sounds. No decreased breath sounds, wheezing, rhonchi or rales.   Abdominal:      General: Bowel sounds are normal. There is no distension.      Palpations: Abdomen is soft. There is no hepatomegaly, splenomegaly or mass.      Tenderness: There is no abdominal tenderness.      Comments: Surgical port entry sites well approximated, a JA drain is in place.   Skin:     General: Skin is warm.      Findings: No rash.   Neurological:      General: No focal deficit present.      Mental Status: He is alert and oriented to person, place, and time.      Cranial Nerves: No cranial nerve deficit.      Sensory: No sensory deficit.   Psychiatric:         Mood and Affect: Mood normal.         Behavior: Behavior normal.              CRANIAL NERVES     CN III, IV, VI   Pupils are equal, round, and reactive to light.       Significant Labs: All pertinent labs within the past 24 hours have been reviewed.  CBC:   Recent Labs   Lab 10/28/24  0411   WBC 15.97*   HGB 14.9   HCT 45.4        CMP:   Recent Labs   Lab 10/28/24  0411      K 3.8      CO2 27   *   BUN 17   CREATININE 1.0   CALCIUM 8.5*   PROT 7.1   ALBUMIN 3.7   BILITOT 1.0   ALKPHOS 50*   AST 10   ALT 16   ANIONGAP 8     Microbiology Results (last 7 days)       ** No results found for the last 168 hours. **          Significant Imaging:   CT abdomen and Pelvis with IV contrast:  Findings concerning for acute appendicitis with associated perforation/abscess as detailed above.  Emergent surgical consultation is advised.  Right lower quadrant inflammatory change involves distal small bowel loops noting there are a few prominent/mildly dilated fluid and air-filled loops of proximal small bowel which could relate to delayed transit/developing  ileus.  Short segment of focal ectasia/fusiform dilatation of the infrarenal abdominal aorta with crecentric intraluminal hypoattenuation as detailed above.  Findings could reflect short segment dissection and/or ulcerated atheromatous plaque.  Large renal calculi in the right renal collecting system.  Nonobstructing left nephrolithiasis.  Left renal cortical hypodensities with attenuation values greater than that anticipated for simple fluid.  Further assessment with nonemergent ultrasound advised.  Nonspecific bilateral perinephric fat stranding, circumferential bladder wall thickening which could relate to nondistention, cystitis or outlet obstruction noting mild prostatomegaly.  Correlation with urinalysis advised.  Hepatomegaly and findings suggestive of hepatic steatosis.  Correlation with LFTs advised.

## 2024-10-28 NOTE — ASSESSMENT & PLAN NOTE
Short segment of focal ectasia/fusiform dilatation of the infrarenal abdominal aorta with crecentric intraluminal hypoattenuation as detailed above. Findings could reflect short segment dissection and/or ulcerated atheromatous plaque.     Vascular surgery consultation for the opinion

## 2024-10-29 ENCOUNTER — CLINICAL SUPPORT (OUTPATIENT)
Dept: SMOKING CESSATION | Facility: CLINIC | Age: 59
End: 2024-10-29
Payer: COMMERCIAL

## 2024-10-29 DIAGNOSIS — F17.200 NICOTINE DEPENDENCE: Primary | ICD-10-CM

## 2024-10-29 LAB
ANION GAP SERPL CALC-SCNC: 6 MMOL/L (ref 8–16)
BASOPHILS # BLD AUTO: 0.03 K/UL (ref 0–0.2)
BASOPHILS NFR BLD: 0.2 % (ref 0–1.9)
BUN SERPL-MCNC: 11 MG/DL (ref 6–20)
CALCIUM SERPL-MCNC: 7.9 MG/DL (ref 8.7–10.5)
CHLORIDE SERPL-SCNC: 103 MMOL/L (ref 95–110)
CO2 SERPL-SCNC: 29 MMOL/L (ref 23–29)
CREAT SERPL-MCNC: 1 MG/DL (ref 0.5–1.4)
DIFFERENTIAL METHOD BLD: ABNORMAL
EOSINOPHIL # BLD AUTO: 0.1 K/UL (ref 0–0.5)
EOSINOPHIL NFR BLD: 0.7 % (ref 0–8)
ERYTHROCYTE [DISTWIDTH] IN BLOOD BY AUTOMATED COUNT: 13.5 % (ref 11.5–14.5)
EST. GFR  (NO RACE VARIABLE): >60 ML/MIN/1.73 M^2
GLUCOSE SERPL-MCNC: 116 MG/DL (ref 70–110)
HCT VFR BLD AUTO: 45.8 % (ref 40–54)
HGB BLD-MCNC: 14.5 G/DL (ref 14–18)
IMM GRANULOCYTES # BLD AUTO: 0.08 K/UL (ref 0–0.04)
IMM GRANULOCYTES NFR BLD AUTO: 0.5 % (ref 0–0.5)
LYMPHOCYTES # BLD AUTO: 2.3 K/UL (ref 1–4.8)
LYMPHOCYTES NFR BLD: 15.6 % (ref 18–48)
MCH RBC QN AUTO: 29.2 PG (ref 27–31)
MCHC RBC AUTO-ENTMCNC: 31.7 G/DL (ref 32–36)
MCV RBC AUTO: 92 FL (ref 82–98)
MONOCYTES # BLD AUTO: 1.7 K/UL (ref 0.3–1)
MONOCYTES NFR BLD: 11.2 % (ref 4–15)
NEUTROPHILS # BLD AUTO: 10.6 K/UL (ref 1.8–7.7)
NEUTROPHILS NFR BLD: 71.8 % (ref 38–73)
NRBC BLD-RTO: 0 /100 WBC
PLATELET # BLD AUTO: 232 K/UL (ref 150–450)
PMV BLD AUTO: 12.1 FL (ref 9.2–12.9)
POTASSIUM SERPL-SCNC: 4.2 MMOL/L (ref 3.5–5.1)
RBC # BLD AUTO: 4.96 M/UL (ref 4.6–6.2)
SODIUM SERPL-SCNC: 138 MMOL/L (ref 136–145)
WBC # BLD AUTO: 14.73 K/UL (ref 3.9–12.7)

## 2024-10-29 PROCEDURE — 25000003 PHARM REV CODE 250: Performed by: SURGERY

## 2024-10-29 PROCEDURE — 85025 COMPLETE CBC W/AUTO DIFF WBC: CPT | Performed by: INTERNAL MEDICINE

## 2024-10-29 PROCEDURE — 25000003 PHARM REV CODE 250: Performed by: INTERNAL MEDICINE

## 2024-10-29 PROCEDURE — 99406 BEHAV CHNG SMOKING 3-10 MIN: CPT

## 2024-10-29 PROCEDURE — 94761 N-INVAS EAR/PLS OXIMETRY MLT: CPT

## 2024-10-29 PROCEDURE — 21400001 HC TELEMETRY ROOM

## 2024-10-29 PROCEDURE — 63600175 PHARM REV CODE 636 W HCPCS: Performed by: INTERNAL MEDICINE

## 2024-10-29 PROCEDURE — 80048 BASIC METABOLIC PNL TOTAL CA: CPT | Performed by: INTERNAL MEDICINE

## 2024-10-29 PROCEDURE — 36415 COLL VENOUS BLD VENIPUNCTURE: CPT | Performed by: INTERNAL MEDICINE

## 2024-10-29 PROCEDURE — 63600175 PHARM REV CODE 636 W HCPCS: Performed by: SURGERY

## 2024-10-29 PROCEDURE — 94799 UNLISTED PULMONARY SVC/PX: CPT

## 2024-10-29 PROCEDURE — 99900031 HC PATIENT EDUCATION (STAT)

## 2024-10-29 RX ORDER — NAPROXEN SODIUM 220 MG/1
81 TABLET, FILM COATED ORAL DAILY
Status: DISCONTINUED | OUTPATIENT
Start: 2024-10-29 | End: 2024-10-30 | Stop reason: HOSPADM

## 2024-10-29 RX ADMIN — PANTOPRAZOLE SODIUM 40 MG: 40 TABLET, DELAYED RELEASE ORAL at 06:10

## 2024-10-29 RX ADMIN — MORPHINE SULFATE 2 MG: 2 INJECTION, SOLUTION INTRAMUSCULAR; INTRAVENOUS at 03:10

## 2024-10-29 RX ADMIN — PIPERACILLIN SODIUM AND TAZOBACTAM SODIUM 3.38 G: 3; .375 INJECTION, POWDER, LYOPHILIZED, FOR SOLUTION INTRAVENOUS at 06:10

## 2024-10-29 RX ADMIN — PIPERACILLIN SODIUM AND TAZOBACTAM SODIUM 3.38 G: 3; .375 INJECTION, POWDER, LYOPHILIZED, FOR SOLUTION INTRAVENOUS at 03:10

## 2024-10-29 RX ADMIN — ENOXAPARIN SODIUM 40 MG: 40 INJECTION SUBCUTANEOUS at 04:10

## 2024-10-29 RX ADMIN — MUPIROCIN 1 G: 20 OINTMENT TOPICAL at 10:10

## 2024-10-29 RX ADMIN — ASPIRIN 81 MG 81 MG: 81 TABLET ORAL at 03:10

## 2024-10-29 RX ADMIN — PIPERACILLIN SODIUM AND TAZOBACTAM SODIUM 3.38 G: 3; .375 INJECTION, POWDER, LYOPHILIZED, FOR SOLUTION INTRAVENOUS at 10:10

## 2024-10-29 RX ADMIN — SODIUM CHLORIDE: 9 INJECTION, SOLUTION INTRAVENOUS at 03:10

## 2024-10-29 RX ADMIN — SODIUM CHLORIDE: 9 INJECTION, SOLUTION INTRAVENOUS at 12:10

## 2024-10-29 RX ADMIN — HYDROCODONE BITARTRATE AND ACETAMINOPHEN 1 TABLET: 5; 325 TABLET ORAL at 01:10

## 2024-10-29 RX ADMIN — MUPIROCIN 1 G: 20 OINTMENT TOPICAL at 08:10

## 2024-10-29 NOTE — CARE UPDATE
10/28/24 1955   Patient Assessment/Suction   Level of Consciousness (AVPU) alert   Respiratory Effort Normal;Unlabored   Expansion/Accessory Muscles/Retractions no use of accessory muscles   All Lung Fields Breath Sounds equal bilaterally   Rhythm/Pattern, Respiratory unlabored   Cough Frequency no cough   PRE-TX-O2   Device (Oxygen Therapy) nasal cannula   Flow (L/min) (Oxygen Therapy) 2   SpO2 95 %   Pulse Oximetry Type Intermittent   $ Pulse Oximetry - Multiple Charge Pulse Oximetry - Multiple   Pulse 97   Resp 18   Incentive Spirometer   $ Incentive Spirometer Charges done with encouragement;proper technique demonstrated   Incentive Spirometer Predicted Level (mL) 2500   Administration (IS) instruction provided, follow-up;proper technique demonstrated   Number of Repetitions (IS) 10   Level Incentive Spirometer (mL) 2500   Patient Tolerance (IS) good   Education   $ Education Incentive Spirometry;Oxygen;15 min

## 2024-10-29 NOTE — ASSESSMENT & PLAN NOTE
Short segment of focal ectasia/fusiform dilatation of the infrarenal abdominal aorta with crecentric intraluminal hypoattenuation as detailed above. Findings could reflect short segment dissection and/or ulcerated atheromatous plaque.   Appreciate vascular surgery recommendations.  Continue aspirin 81 mg daily.  Smoking cessation counseling performed.  Patient to have follow-up CTA abdomen and pelvis in 3 months and follow-up with vascular surgery as outpatient.

## 2024-10-29 NOTE — ASSESSMENT & PLAN NOTE
S/p Laparoscopic appendectomy - POD # 1.  Continue to follow Dr. Bailon recommendations.  Continue 1 more day of IV Zosyn and patient can be discharged on oral Augmentin antibiotic therapy tomorrow.  Needs aggressive incentive spirometry.  Follow hemoglobin and hematocrit closely.  Pain control with PO narcotics and antiemetics as needed.

## 2024-10-29 NOTE — CARE UPDATE
10/29/24 0707   Patient Assessment/Suction   Level of Consciousness (AVPU) alert   Respiratory Effort Unlabored;Normal   All Lung Fields Breath Sounds clear;equal bilaterally   Rhythm/Pattern, Respiratory unlabored;pattern regular;depth regular   Cough Frequency no cough   PRE-TX-O2   Device (Oxygen Therapy) room air   SpO2 (!) 94 %   Pulse Oximetry Type Intermittent   $ Pulse Oximetry - Multiple Charge Pulse Oximetry - Multiple   Pulse 93   Resp 18   Incentive Spirometer   $ Incentive Spirometer Charges done with encouragement   Incentive Spirometer Predicted Level (mL) 2500   Administration (IS) proper technique demonstrated   Number of Repetitions (IS) 10   Level Incentive Spirometer (mL) 2500   Patient Tolerance (IS) good   Education   $ Education 15 min;Incentive Spirometry

## 2024-10-29 NOTE — HOSPITAL COURSE
Postoperatively patient did well.  Patient reported adequate pain control.  Patient maintained on intravenous Zosyn antibiotic therapy.  Patient was closely followed by general surgery team.  Diet slowly advanced.  Patient continued aggressive incentive spirometry.  Patient was evaluated by vascular surgeon who recommended aspirin 81 mg daily.  Patient was extensively counseled regarding dangers of tobacco cigarette smoking and smoking cessation counseling performed.  Patient to start taking aspirin 81 mg daily and will have CT angiogram of the abdomen and pelvis performed in 3 months followed by outpatient vascular surgery appointment.  Pt continued to clinically improve.  Pain was well controlled.  He was passing flatus, though KUB shows some dilated bowel loops concerning for developing ileus.  However, clinically Pt was tolerating diet and had increased gas output.  We discussed need to re-presented if stop size and gas are not having bowel movements.  He was tolerating diet advancement.  He was eager for discharge.  He was cleared for discharge by General surgery.  Strict return precautions were given.  We discussed stool softeners should help with bowel regimen.  He will keep drain in place.  Instructed on drain care per nursing.  He will complete a 10 day course of Abx.  He will follow up closely with general surgeon in clinic for further evaluation and management.  Return precautions were provided.    Pt seen on day of discharge and deemed appropriate for discharge.

## 2024-10-29 NOTE — PROGRESS NOTES
[facility] Medicine  Progress Note    Patient Name: Landry Hartmann  MRN: [id number]  Patient Class: IP- Inpatient   Admission Date: 10/28/2024  Length of Stay: 1 days  Attending Physician: Kd Galeana MD  Primary Care Provider: Antonio Devine MD        Subjective:     Principal Problem:Acute appendicitis with perforation, generalized peritonitis, and abscess, without gangrene        HPI:   59-year-old male chronic smoker , kidney stone problem came with right-sided abdominal pain.  He recently passed a kidney stone.  Initially he thought pain from the kidney stone.  But his  stomach has been rumbling and decreased appetite and abdominal pain started in the right iliac fossa area and gradually moved to Marley umbilical area and came to the hospital.  CT scan of the abdomen was done and found to have Findings concerning for acute appendicitis with associated perforation/abscess and admitted.  Other abnormal findings are also noted.  Surgeon was consulted from ER    Overview/Hospital Course:  No notes on file    Subjective:  Patient seen and examined.  Status post laparoscopic appendectomy.  Patient is in good spirits.  Reports adequate pain control.  Tolerating clear liquids.  Discussed with Dr. French.  No nausea vomiting, fever, chest pain or shortness of breath noted.    Review of Systems   Constitutional:  Negative for chills and fever.   HENT:  Negative for congestion and sore throat.    Eyes:  Negative for visual disturbance.   Respiratory:  Negative for cough and shortness of breath.    Cardiovascular:  Negative for chest pain and palpitations.   Gastrointestinal:  Positive for abdominal pain and nausea. Negative for constipation, diarrhea and vomiting.   Endocrine: Negative for cold intolerance and heat intolerance.   Genitourinary:  Negative for dysuria and hematuria.   Musculoskeletal:  Negative for arthralgias and myalgias.   Skin:  Negative for rash.   Neurological:  Negative for  tremors and seizures.   Hematological:  Negative for adenopathy. Does not bruise/bleed easily.     Objective:     Vital Signs (Most Recent):  Temp: 98.3 °F (36.8 °C) (10/28/24 1530)  Pulse: 96 (10/28/24 1530)  Resp: 16 (10/28/24 1530)  BP: 107/72 (10/28/24 1530)  SpO2: 95 % (10/28/24 1530) Vital Signs (24h Range):  Temp:  [97.3 °F (36.3 °C)-98.9 °F (37.2 °C)] 98.3 °F (36.8 °C)  Pulse:  [] 96  Resp:  [16-23] 16  SpO2:  [93 %-98 %] 95 %  BP: (107-131)/(70-82) 107/72     Weight: 103 kg (227 lb 1.2 oz)  Body mass index is 31.67 kg/m².     Physical Exam  Vitals and nursing note reviewed.   Constitutional:       Appearance: Normal appearance. He is well-developed.   HENT:      Head: Normocephalic and atraumatic.      Nose: Nose normal. No septal deviation.   Eyes:      Conjunctiva/sclera: Conjunctivae normal.      Pupils: Pupils are equal, round, and reactive to light.   Neck:      Thyroid: No thyroid mass.      Vascular: No JVD.      Trachea: No tracheal tenderness or tracheal deviation.   Cardiovascular:      Rate and Rhythm: Normal rate and regular rhythm.      Heart sounds: S1 normal and S2 normal. No murmur heard.     No friction rub. No gallop.   Pulmonary:      Effort: Pulmonary effort is normal.      Breath sounds: Normal breath sounds. No decreased breath sounds, wheezing, rhonchi or rales.   Abdominal:      General: Bowel sounds are normal. There is no distension.      Palpations: Abdomen is soft. There is no hepatomegaly, splenomegaly or mass.      Tenderness: There is no abdominal tenderness.      Comments: Surgical port entry sites well approximated, a JA drain is in place.   Skin:     General: Skin is warm.      Findings: No rash.   Neurological:      General: No focal deficit present.      Mental Status: He is alert and oriented to person, place, and time.      Cranial Nerves: No cranial nerve deficit.      Sensory: No sensory deficit.   Psychiatric:         Mood and Affect: Mood normal.          Behavior: Behavior normal.              CRANIAL NERVES     CN III, IV, VI   Pupils are equal, round, and reactive to light.       Significant Labs: All pertinent labs within the past 24 hours have been reviewed.  CBC:   Recent Labs   Lab 10/28/24  0411   WBC 15.97*   HGB 14.9   HCT 45.4        CMP:   Recent Labs   Lab 10/28/24  0411      K 3.8      CO2 27   *   BUN 17   CREATININE 1.0   CALCIUM 8.5*   PROT 7.1   ALBUMIN 3.7   BILITOT 1.0   ALKPHOS 50*   AST 10   ALT 16   ANIONGAP 8     Microbiology Results (last 7 days)       ** No results found for the last 168 hours. **          Significant Imaging:   CT abdomen and Pelvis with IV contrast:  Findings concerning for acute appendicitis with associated perforation/abscess as detailed above.  Emergent surgical consultation is advised.  Right lower quadrant inflammatory change involves distal small bowel loops noting there are a few prominent/mildly dilated fluid and air-filled loops of proximal small bowel which could relate to delayed transit/developing ileus.  Short segment of focal ectasia/fusiform dilatation of the infrarenal abdominal aorta with crecentric intraluminal hypoattenuation as detailed above.  Findings could reflect short segment dissection and/or ulcerated atheromatous plaque.  Large renal calculi in the right renal collecting system.  Nonobstructing left nephrolithiasis.  Left renal cortical hypodensities with attenuation values greater than that anticipated for simple fluid.  Further assessment with nonemergent ultrasound advised.  Nonspecific bilateral perinephric fat stranding, circumferential bladder wall thickening which could relate to nondistention, cystitis or outlet obstruction noting mild prostatomegaly.  Correlation with urinalysis advised.  Hepatomegaly and findings suggestive of hepatic steatosis.  Correlation with LFTs advised.    Assessment/Plan:      * Acute appendicitis with perforation, generalized  "peritonitis, and abscess, without gangrene  S/p Laparoscopic appendectomy - POD # 1.  Continue to follow Dr. Bailon recommendations.  Continue 1 more day of IV Zosyn and patient can be discharged on oral Augmentin antibiotic therapy tomorrow.  Needs aggressive incentive spirometry.  Follow hemoglobin and hematocrit closely.  Pain control with PO narcotics and antiemetics as needed.      Atherosclerotic plaque  Short segment of focal ectasia/fusiform dilatation of the infrarenal abdominal aorta with crecentric intraluminal hypoattenuation as detailed above. Findings could reflect short segment dissection and/or ulcerated atheromatous plaque.   Appreciate vascular surgery recommendations.  Continue aspirin 81 mg daily.  Smoking cessation counseling performed.  Patient to have follow-up CTA abdomen and pelvis in 3 months and follow-up with vascular surgery as outpatient.      Hypocalcemia  Replace      Appendicitis  See management of "acute appendicitis with perforation".       Smoking  Smoking cessation counseling performed. Dangers of cigarette smoking were reviewed with patient in detail and patient was encouraged to quit. Nicotine replacement options were discussed for > 3 minutes.        Class 2 obesity with body mass index (BMI) of 35.0 to 35.9 in adult  Body mass index is 31.67 kg/m². Morbid obesity complicates all aspects of disease management from diagnostic modalities to treatment. Weight loss encouraged and health benefits explained to patient.         Encourage patient to continue incentive spirometry and increase ambulation. Patient to have follow-up CTA abdomen and pelvis in 3 months and follow-up with vascular surgery as outpatient.  VTE Risk Mitigation (From admission, onward)           Ordered     enoxaparin injection 40 mg  Daily         10/28/24 1208     Place sequential compression device  Until discontinued         10/28/24 1211     IP VTE HIGH RISK PATIENT  Once         10/28/24 1208     Place " sequential compression device  Until discontinued         10/28/24 0625     Place sequential compression device  Until discontinued         10/28/24 0617                    Discharge Planning   KATELIN: 10/30/2024     Code Status: Full Code   Is the patient medically ready for discharge?:     Reason for patient still in hospital (select all that apply): Patient trending condition and Consult recommendations  Discharge Plan A: Home with family                  Kd Galeana MD  Department of Hospital Medicine   [facility]

## 2024-10-29 NOTE — PLAN OF CARE
CM met with patient at bedside during SIBR rounds. Anticipating hospital discharge on tomorrow, per Dr. French. [employer] message sent to Dr. French's clinic requesting surgery follow up appointment - clinic to contact patient. Patient denied additional CM needs.     Wife to transport home on tomorrow     10/29/24 0950   Discharge Reassessment   Assessment Type Discharge Planning Reassessment   Did the patient's condition or plan change since previous assessment? Yes   Discharge Plan discussed with: Patient   Communicated KATELIN with patient/caregiver Yes   Discharge Plan A Home with family   Post-Acute Status   Post-Acute Authorization Other   Other Status Awaiting f/u Appts

## 2024-10-30 VITALS
BODY MASS INDEX: 31.79 KG/M2 | HEIGHT: 71 IN | RESPIRATION RATE: 18 BRPM | DIASTOLIC BLOOD PRESSURE: 77 MMHG | WEIGHT: 227.06 LBS | TEMPERATURE: 98 F | SYSTOLIC BLOOD PRESSURE: 128 MMHG | OXYGEN SATURATION: 94 % | HEART RATE: 92 BPM

## 2024-10-30 LAB
ANION GAP SERPL CALC-SCNC: 10 MMOL/L (ref 8–16)
BASOPHILS # BLD AUTO: 0.04 K/UL (ref 0–0.2)
BASOPHILS NFR BLD: 0.3 % (ref 0–1.9)
BUN SERPL-MCNC: 12 MG/DL (ref 6–20)
CALCIUM SERPL-MCNC: 8.1 MG/DL (ref 8.7–10.5)
CHLORIDE SERPL-SCNC: 104 MMOL/L (ref 95–110)
CO2 SERPL-SCNC: 22 MMOL/L (ref 23–29)
CREAT SERPL-MCNC: 0.8 MG/DL (ref 0.5–1.4)
DIFFERENTIAL METHOD BLD: ABNORMAL
EOSINOPHIL # BLD AUTO: 0.3 K/UL (ref 0–0.5)
EOSINOPHIL NFR BLD: 2.4 % (ref 0–8)
ERYTHROCYTE [DISTWIDTH] IN BLOOD BY AUTOMATED COUNT: 13.2 % (ref 11.5–14.5)
EST. GFR  (NO RACE VARIABLE): >60 ML/MIN/1.73 M^2
GLUCOSE SERPL-MCNC: 116 MG/DL (ref 70–110)
HCT VFR BLD AUTO: 48.5 % (ref 40–54)
HGB BLD-MCNC: 15.4 G/DL (ref 14–18)
IMM GRANULOCYTES # BLD AUTO: 0.09 K/UL (ref 0–0.04)
IMM GRANULOCYTES NFR BLD AUTO: 0.7 % (ref 0–0.5)
LYMPHOCYTES # BLD AUTO: 2.3 K/UL (ref 1–4.8)
LYMPHOCYTES NFR BLD: 18 % (ref 18–48)
MCH RBC QN AUTO: 29.2 PG (ref 27–31)
MCHC RBC AUTO-ENTMCNC: 31.8 G/DL (ref 32–36)
MCV RBC AUTO: 92 FL (ref 82–98)
MONOCYTES # BLD AUTO: 1.6 K/UL (ref 0.3–1)
MONOCYTES NFR BLD: 12.2 % (ref 4–15)
NEUTROPHILS # BLD AUTO: 8.5 K/UL (ref 1.8–7.7)
NEUTROPHILS NFR BLD: 66.4 % (ref 38–73)
NRBC BLD-RTO: 0 /100 WBC
PLATELET # BLD AUTO: 214 K/UL (ref 150–450)
PMV BLD AUTO: 11.7 FL (ref 9.2–12.9)
POTASSIUM SERPL-SCNC: 3.8 MMOL/L (ref 3.5–5.1)
RBC # BLD AUTO: 5.28 M/UL (ref 4.6–6.2)
SODIUM SERPL-SCNC: 136 MMOL/L (ref 136–145)
WBC # BLD AUTO: 12.75 K/UL (ref 3.9–12.7)

## 2024-10-30 PROCEDURE — 25000003 PHARM REV CODE 250: Performed by: SURGERY

## 2024-10-30 PROCEDURE — 80048 BASIC METABOLIC PNL TOTAL CA: CPT | Performed by: INTERNAL MEDICINE

## 2024-10-30 PROCEDURE — 85025 COMPLETE CBC W/AUTO DIFF WBC: CPT | Performed by: INTERNAL MEDICINE

## 2024-10-30 PROCEDURE — 63600175 PHARM REV CODE 636 W HCPCS: Performed by: INTERNAL MEDICINE

## 2024-10-30 PROCEDURE — 25000003 PHARM REV CODE 250: Performed by: INTERNAL MEDICINE

## 2024-10-30 PROCEDURE — 94799 UNLISTED PULMONARY SVC/PX: CPT

## 2024-10-30 PROCEDURE — 36415 COLL VENOUS BLD VENIPUNCTURE: CPT | Performed by: INTERNAL MEDICINE

## 2024-10-30 PROCEDURE — 94761 N-INVAS EAR/PLS OXIMETRY MLT: CPT

## 2024-10-30 RX ORDER — AMOXICILLIN AND CLAVULANATE POTASSIUM 875; 125 MG/1; MG/1
1 TABLET, FILM COATED ORAL EVERY 12 HOURS
Qty: 20 TABLET | Refills: 0 | Status: SHIPPED | OUTPATIENT
Start: 2024-10-30 | End: 2024-11-09

## 2024-10-30 RX ORDER — HYDROCODONE BITARTRATE AND ACETAMINOPHEN 5; 325 MG/1; MG/1
1 TABLET ORAL EVERY 6 HOURS PRN
Qty: 16 TABLET | Refills: 0 | Status: SHIPPED | OUTPATIENT
Start: 2024-10-30

## 2024-10-30 RX ORDER — ASPIRIN 81 MG/1
81 TABLET ORAL DAILY
Qty: 30 TABLET | Refills: 0 | Status: SHIPPED | OUTPATIENT
Start: 2024-10-30 | End: 2025-10-30

## 2024-10-30 RX ADMIN — ASPIRIN 81 MG 81 MG: 81 TABLET ORAL at 09:10

## 2024-10-30 RX ADMIN — PANTOPRAZOLE SODIUM 40 MG: 40 TABLET, DELAYED RELEASE ORAL at 06:10

## 2024-10-30 RX ADMIN — PIPERACILLIN SODIUM AND TAZOBACTAM SODIUM 3.38 G: 3; .375 INJECTION, POWDER, LYOPHILIZED, FOR SOLUTION INTRAVENOUS at 06:10

## 2024-10-30 RX ADMIN — MUPIROCIN 1 G: 20 OINTMENT TOPICAL at 09:10

## 2024-10-30 NOTE — NURSING
Patient discharged home per orders, all instructions reviewed with patient and patient verbalized understanding.  IV discontinued x 1, patient educated on JA drain and to record the amount of drainage, patient verbalized understanding.  Patient ambulated out of facility with staff and spouse by his side, no complaints of pain or distress at this time.

## 2024-10-30 NOTE — NURSING
Patient ambulating hallway with spouse by his side, no complaints of pain voiced at this time.  Drainage serosanguineous draining 50-80ml every 30 minutes, MD aware and ok with that.  Patient clear to discharge.

## 2024-10-30 NOTE — PROGRESS NOTES
[facility]  General Surgery  Progress Note    Subjective:     Interval History: no acute events overnight. No fevers. No nausea.   JA serosanguineous   WBC 14.7    Post-Op Info:  Procedure(s) (LRB):  APPENDECTOMY, LAPAROSCOPIC (N/A)   1 Day Post-Op      Medications:  Continuous Infusions:   0.9% NaCl   Intravenous Continuous 125 mL/hr at 10/29/24 1530 New Bag at 10/29/24 1530     Scheduled Meds:   aspirin  81 mg Oral Daily    enoxparin  40 mg Subcutaneous Daily    mupirocin  1 g Nasal BID    pantoprazole  40 mg Oral Daily    piperacillin-tazobactam (Zosyn) IV (PEDS and ADULTS) (extended infusion is not appropriate)  3.375 g Intravenous Q8H     PRN Meds:  Current Facility-Administered Medications:     calcium gluconate IVPB, 1 g, Intravenous, PRN    calcium gluconate IVPB, 2 g, Intravenous, PRN    calcium gluconate IVPB, 3 g, Intravenous, PRN    dextrose 50%, 12.5 g, Intravenous, PRN    dextrose 50%, 12.5 g, Intravenous, PRN    dextrose 50%, 25 g, Intravenous, PRN    dextrose 50%, 25 g, Intravenous, PRN    glucagon (human recombinant), 1 mg, Intramuscular, PRN    glucagon (human recombinant), 1 mg, Intramuscular, PRN    glucose, 16 g, Oral, PRN    glucose, 16 g, Oral, PRN    glucose, 24 g, Oral, PRN    glucose, 24 g, Oral, PRN    HYDROcodone-acetaminophen, 1 tablet, Oral, Q4H PRN    magnesium sulfate IVPB, 2 g, Intravenous, PRN    magnesium sulfate IVPB, 4 g, Intravenous, PRN    morphine, 2 mg, Intravenous, Q6H PRN    naloxone, 0.02 mg, Intravenous, PRN    ondansetron, 8 mg, Oral, Q8H PRN    potassium chloride, 40 mEq, Intravenous, PRN **AND** potassium chloride, 60 mEq, Intravenous, PRN **AND** potassium chloride, 80 mEq, Intravenous, PRN    sodium chloride 0.9%, 10 mL, Intravenous, Q12H PRN    sodium chloride 0.9%, 10 mL, Intravenous, Q12H PRN    sodium phosphate 15 mmol in D5W 250 mL IVPB, 15 mmol, Intravenous, PRN    sodium phosphate 20.01 mmol in D5W 250 mL IVPB, 20.01 mmol, Intravenous, PRN     sodium phosphate 30 mmol in D5W 250 mL IVPB, 30 mmol, Intravenous, PRN     Objective:     Vital Signs (Most Recent):  Temp: 98.2 °F (36.8 °C) (10/29/24 1915)  Pulse: 96 (10/29/24 1915)  Resp: 16 (10/29/24 1915)  BP: 119/75 (10/29/24 1915)  SpO2: 96 % (10/29/24 2004) Vital Signs (24h Range):  Temp:  [98.2 °F (36.8 °C)-98.8 °F (37.1 °C)] 98.2 °F (36.8 °C)  Pulse:  [93-99] 96  Resp:  [16-18] 16  SpO2:  [92 %-96 %] 96 %  BP: (105-126)/(66-79) 119/75       Intake/Output Summary (Last 24 hours) at 10/29/2024 2118  Last data filed at 10/29/2024 1922  Gross per 24 hour   Intake 2560 ml   Output 435 ml   Net 2125 ml       Physical Exam  Constitutional:       General: He is awake. He is not in acute distress.     Appearance: He is not toxic-appearing.   Pulmonary:      Effort: No tachypnea, bradypnea or respiratory distress.   Abdominal:      General: There is no distension.      Palpations: Abdomen is soft.      Tenderness: There is abdominal tenderness.      Comments: Appropriately tender   JA serosanguineous   Incisions ok    Neurological:      Mental Status: He is alert and oriented to person, place, and time.   Psychiatric:         Behavior: Behavior is cooperative.         Significant Labs:  CBC:   Recent Labs   Lab 10/29/24  0438   WBC 14.73*   RBC 4.96   HGB 14.5   HCT 45.8      MCV 92   MCH 29.2   MCHC 31.7*     CMP:   Recent Labs   Lab 10/28/24  0411 10/29/24  0438   * 116*   CALCIUM 8.5* 7.9*   ALBUMIN 3.7  --    PROT 7.1  --     138   K 3.8 4.2   CO2 27 29    103   BUN 17 11   CREATININE 1.0 1.0   ALKPHOS 50*  --    ALT 16  --    AST 10  --    BILITOT 1.0  --        Significant Diagnostics:  I have reviewed all pertinent imaging results/findings within the past 24 hours.    Assessment/Plan:     Active Diagnoses:    Diagnosis Date Noted POA    PRINCIPAL PROBLEM:  Acute appendicitis with perforation, generalized peritonitis, and abscess, without gangrene [K35.211] 10/28/2024 Yes     Appendicitis [K37] 10/28/2024 Yes    Atherosclerotic plaque [I70.90] 10/28/2024 Yes    Class 2 obesity with body mass index (BMI) of 35.0 to 35.9 in adult [E66.812, Z68.35] 08/26/2022 Not Applicable    Smoking [F17.200] 08/26/2022 Yes      Problems Resolved During this Admission:     -POD 1 appendectomy and drainage of abscess for perforated appendicitis with abscess.   -Doing well. Continue Zosyn today.   -advance diet as tolerated.   -ambulate, PT  -IS -DVT prophylaxis    Dusty French III, MD  General Surgery  [facility]

## 2024-10-30 NOTE — PROGRESS NOTES
[facility]  General Surgery  Progress Note    Subjective:     Interval History: no acute events overnight. He reports no nausea. Feels little more bloated. Passing small amount of flatus.   KUB showed dilated small bowel.   Wbc better.       Post-Op Info:  Procedure(s) (LRB):  APPENDECTOMY, LAPAROSCOPIC (N/A)   2 Days Post-Op      Medications:  Continuous Infusions:   0.9% NaCl   Intravenous Continuous 125 mL/hr at 10/29/24 1530 New Bag at 10/29/24 1530     Scheduled Meds:   aspirin  81 mg Oral Daily    enoxparin  40 mg Subcutaneous Daily    mupirocin  1 g Nasal BID    pantoprazole  40 mg Oral Daily    piperacillin-tazobactam (Zosyn) IV (PEDS and ADULTS) (extended infusion is not appropriate)  3.375 g Intravenous Q8H     PRN Meds:  Current Facility-Administered Medications:     calcium gluconate IVPB, 1 g, Intravenous, PRN    calcium gluconate IVPB, 2 g, Intravenous, PRN    calcium gluconate IVPB, 3 g, Intravenous, PRN    dextrose 50%, 12.5 g, Intravenous, PRN    dextrose 50%, 12.5 g, Intravenous, PRN    dextrose 50%, 25 g, Intravenous, PRN    dextrose 50%, 25 g, Intravenous, PRN    glucagon (human recombinant), 1 mg, Intramuscular, PRN    glucagon (human recombinant), 1 mg, Intramuscular, PRN    glucose, 16 g, Oral, PRN    glucose, 16 g, Oral, PRN    glucose, 24 g, Oral, PRN    glucose, 24 g, Oral, PRN    HYDROcodone-acetaminophen, 1 tablet, Oral, Q4H PRN    magnesium sulfate IVPB, 2 g, Intravenous, PRN    magnesium sulfate IVPB, 4 g, Intravenous, PRN    morphine, 2 mg, Intravenous, Q6H PRN    naloxone, 0.02 mg, Intravenous, PRN    ondansetron, 8 mg, Oral, Q8H PRN    potassium chloride, 40 mEq, Intravenous, PRN **AND** potassium chloride, 60 mEq, Intravenous, PRN **AND** potassium chloride, 80 mEq, Intravenous, PRN    sodium chloride 0.9%, 10 mL, Intravenous, Q12H PRN    sodium chloride 0.9%, 10 mL, Intravenous, Q12H PRN    sodium phosphate 15 mmol in D5W 250 mL IVPB, 15 mmol, Intravenous, PRN     sodium phosphate 20.01 mmol in D5W 250 mL IVPB, 20.01 mmol, Intravenous, PRN    sodium phosphate 30 mmol in D5W 250 mL IVPB, 30 mmol, Intravenous, PRN     Objective:     Vital Signs (Most Recent):  Temp: 98.3 °F (36.8 °C) (10/30/24 1120)  Pulse: 92 (10/30/24 1120)  Resp: 18 (10/30/24 1120)  BP: 128/77 (10/30/24 1120)  SpO2: (!) 94 % (10/30/24 1120) Vital Signs (24h Range):  Temp:  [98.2 °F (36.8 °C)-98.8 °F (37.1 °C)] 98.3 °F (36.8 °C)  Pulse:  [87-99] 92  Resp:  [16-18] 18  SpO2:  [93 %-96 %] 94 %  BP: (111-128)/(68-88) 128/77       Intake/Output Summary (Last 24 hours) at 10/30/2024 1324  Last data filed at 10/30/2024 1301  Gross per 24 hour   Intake 620 ml   Output 140 ml   Net 480 ml       Physical Exam  Constitutional:       General: He is awake. He is not in acute distress.     Appearance: He is not toxic-appearing.   Pulmonary:      Effort: No tachypnea, bradypnea or respiratory distress.   Abdominal:      General: There is no distension.      Palpations: Abdomen is soft.      Tenderness: There is abdominal tenderness.      Comments: Appropriately tender   JA serosanguineous   Incisions ok    Neurological:      Mental Status: He is alert and oriented to person, place, and time.   Psychiatric:         Behavior: Behavior is cooperative.         Significant Labs:  CBC:   Recent Labs   Lab 10/30/24  0458   WBC 12.75*   RBC 5.28   HGB 15.4   HCT 48.5      MCV 92   MCH 29.2   MCHC 31.8*     CMP:   Recent Labs   Lab 10/30/24  0458   *   CALCIUM 8.1*      K 3.8   CO2 22*      BUN 12   CREATININE 0.8       Significant Diagnostics:  I have reviewed all pertinent imaging results/findings within the past 24 hours.    Assessment/Plan:     Active Diagnoses:    Diagnosis Date Noted POA    PRINCIPAL PROBLEM:  Acute appendicitis with perforation, generalized peritonitis, and abscess, without gangrene [K35.211] 10/28/2024 Yes    Appendicitis [K37] 10/28/2024 Yes    Atherosclerotic plaque [I70.90]  10/28/2024 Yes    Class 2 obesity with body mass index (BMI) of 35.0 to 35.9 in adult [E66.812, Z68.35] 08/26/2022 Not Applicable    Smoking [F17.200] 08/26/2022 Yes      Problems Resolved During this Admission:     -clinically patient doing pretty well. Passing flatus. Tolerating clears.   -will see about discharge home through the day if he continues to pass flatus.   -Will leave JA in place for discharge.   -out patient Augmentin for discharge. Follow up with me one week.     Dusty French III, MD  General Surgery  [facility]

## 2024-10-30 NOTE — NURSING
Patient ambulated around the unit 4x with staff by his side, no complaints of pain voiced at this time.  Patient returned to room, seated in bed, no signs of distress.  Call light within easy reach, bed alarm on and sounded when prompted.

## 2024-10-30 NOTE — DISCHARGE INSTRUCTIONS
-take a  probiotic while taking the antibiotic  -Keep the drain covered and change dressing as needed  -taking MIRALAX( over the counter) or docusate can help with softening stool and promoting bowel movements

## 2024-10-30 NOTE — PLAN OF CARE
Discharge orders and chart reviewed. No other discharge needs noted at this time. Pt is clear for discharge from case management, pending tolerating lunch and passing flatus. Pt is discharging to home.    Surgery follow up appointment scheduled and added to AVS    Spouse to transport patient home     10/30/24 1000   Final Note   Assessment Type Final Discharge Note   Anticipated Discharge Disposition Home   What phone number can be called within the next 1-3 days to see how you are doing after discharge? [phone]   Hospital Resources/Appts/Education Provided Appointments scheduled and added to AVS   Post-Acute Status   Discharge Delays (!) Consult Recommendations Completed

## 2024-10-30 NOTE — CARE UPDATE
10/29/24 2004   Patient Assessment/Suction   Level of Consciousness (AVPU) alert   Respiratory Effort Normal;Unlabored   Expansion/Accessory Muscles/Retractions no use of accessory muscles;no retractions   TRE Breath Sounds clear   LLL Breath Sounds clear   RUL Breath Sounds clear   RML Breath Sounds clear   RLL Breath Sounds clear   Rhythm/Pattern, Respiratory unlabored;pattern regular   Cough Frequency no cough   PRE-TX-O2   Device (Oxygen Therapy) room air   SpO2 96 %   Pulse Oximetry Type Intermittent   $ Pulse Oximetry - Multiple Charge Pulse Oximetry - Multiple   Incentive Spirometer   $ Incentive Spirometer Charges done with encouragement   Incentive Spirometer Predicted Level (mL) 2500   Administration (IS) mouthpiece utilized   Number of Repetitions (IS) 8   Level Incentive Spirometer (mL) 2500   Patient Tolerance (IS) good   Education   $ Education Incentive Spirometry;15 min

## 2024-10-30 NOTE — DISCHARGE SUMMARY
[facility]  Discharge Summary      Patient Name: Landry Hartmann  MRN: [id number]  NATALIYA: [id number]  Patient Class: IP- Inpatient  Admission Date: 10/28/2024  Hospital Length of Stay: 2 days  Discharge Date and Time: 10/30/2024  2:23 PM  Attending Physician: [place] att. providers found   Discharging Provider: Norma Hunter NP  Primary Care Provider: Antonio Devine MD    Primary Care Team: Networked reference to record PCT     HPI:    59-year-old male chronic smoker , kidney stone problem came with right-sided abdominal pain.  He recently passed a kidney stone.  Initially he thought pain from the kidney stone.  But his  stomach has been rumbling and decreased appetite and abdominal pain started in the right iliac fossa area and gradually moved to Marley umbilical area and came to the hospital.  CT scan of the abdomen was done and found to have Findings concerning for acute appendicitis with associated perforation/abscess and admitted.  Other abnormal findings are also noted.  Surgeon was consulted from ER    Procedure(s) (LRB):  APPENDECTOMY, LAPAROSCOPIC (N/A)      Hospital Course:   Postoperatively patient did well.  Patient reported adequate pain control.  Patient maintained on intravenous Zosyn antibiotic therapy.  Patient was closely followed by general surgery team.  Diet slowly advanced.  Patient continued aggressive incentive spirometry.  Patient was evaluated by vascular surgeon who recommended aspirin 81 mg daily.  Patient was extensively counseled regarding dangers of tobacco cigarette smoking and smoking cessation counseling performed.  Patient to start taking aspirin 81 mg daily and will have CT angiogram of the abdomen and pelvis performed in 3 months followed by outpatient vascular surgery appointment.  Pt continued to clinically improve.  Pain was well controlled.  He was passing flatus, though KUB shows some dilated bowel loops concerning for developing ileus.  However,  clinically Pt was tolerating diet and had increased gas output.  We discussed need to re-presented if stop size and gas are not having bowel movements.  He was tolerating diet advancement.  He was eager for discharge.  He was cleared for discharge by General surgery.  Strict return precautions were given.  We discussed stool softeners should help with bowel regimen.  He will keep drain in place.  Instructed on drain care per nursing.  He will complete a 10 day course of Abx.  He will follow up closely with general surgeon in clinic for further evaluation and management.  Return precautions were provided.    Pt seen on day of discharge and deemed appropriate for discharge.     Goals of Care Treatment Preferences:  Code Status: Full Code      SDOH Screening:  The patient was screened for utility difficulties, food insecurity, transport difficulties, housing insecurity, and interpersonal safety and there were no concerns identified this admission.     Consults:   Consults (From admission, onward)          Status Ordering Provider     Inpatient consult to Vascular Surgery  Once        Provider:  Juan J Billingsley MD    Completed ALONSO FRENCH III            No new Assessment & Plan notes have been filed under this hospital service since the last note was generated.  Service: Hospital Medicine    Final Active Diagnoses:    Diagnosis Date Noted POA    PRINCIPAL PROBLEM:  Acute appendicitis with perforation, generalized peritonitis, and abscess, without gangrene [K35.211] 10/28/2024 Yes    Appendicitis [K37] 10/28/2024 Yes    Atherosclerotic plaque [I70.90] 10/28/2024 Yes    Class 2 obesity with body mass index (BMI) of 35.0 to 35.9 in adult [E66.812, Z68.35] 08/26/2022 Not Applicable    Smoking [F17.200] 08/26/2022 Yes      Problems Resolved During this Admission:       Discharged Condition: good    Disposition: Home or Self Care    Follow Up:   Follow-up Information       Alonso French III, MD. Go on  11/12/2024.    Specialties: General Surgery, Surgery  Why: surgery follow up appointment scheduled at 10 AM  Contact information:  [address]  [address]  [place] [address]  [phone]               Pilar Ernandez MD Follow up in 3 month(s).    Specialties: Vascular Surgery, Cardiology  Contact information:  [address]  [address]  [place] [address]  [phone]                           Patient Instructions:      Ambulatory referral/consult to Smoking Cessation Program   Standing Status: Future   Referral Priority: Routine Referral Type: Consultation   Referral Reason: Specialty Services Required   Requested Specialty: CTTS   Number of Visits Requested: 1     Diet Adult Regular     Reason for not Prescribing Nicotine Replacement     Order Specific Question Answer Comments   Reason for not Prescribing: Patient refused      Activity as tolerated       Significant Diagnostic Studies: Labs: CMP   Recent Labs   Lab 10/29/24  0438 10/30/24  0458    136   K 4.2 3.8    104   CO2 29 22*   * 116*   BUN 11 12   CREATININE 1.0 0.8   CALCIUM 7.9* 8.1*   ANIONGAP 6* 10    and CBC   Recent Labs   Lab 10/29/24  0438 10/30/24  0458   WBC 14.73* 12.75*   HGB 14.5 15.4   HCT 45.8 48.5    214     Imaging Results               CT Abdomen Pelvis With IV Contrast NO Oral Contrast (Final result)  Result time 10/28/24 05:30:51      Final result by Radha Grossman MD (10/28/24 05:30:51)                   Impression:      Findings concerning for acute appendicitis with associated perforation/abscess as detailed above.  Emergent surgical consultation is advised.    Right lower quadrant inflammatory change involves distal small bowel loops noting there are a few prominent/mildly dilated fluid and air-filled loops of proximal small bowel which could relate to delayed transit/developing ileus.    Short segment of focal ectasia/fusiform dilatation of the infrarenal abdominal aorta with crecentric  intraluminal hypoattenuation as detailed above.  Findings could reflect short segment dissection and/or ulcerated atheromatous plaque.    Large renal calculi in the right renal collecting system.  Nonobstructing left nephrolithiasis.    Left renal cortical hypodensities with attenuation values greater than that anticipated for simple fluid.  Further assessment with nonemergent ultrasound advised.    Nonspecific bilateral perinephric fat stranding, circumferential bladder wall thickening which could relate to nondistention, cystitis or outlet obstruction noting mild prostatomegaly.  Correlation with urinalysis advised.    Hepatomegaly and findings suggestive of hepatic steatosis.  Correlation with LFTs advised.      Electronically signed by: Radha Grossman MD  Date:    10/28/2024  Time:    05:30               Narrative:    EXAMINATION:  CT ABDOMEN PELVIS WITH IV CONTRAST    CLINICAL HISTORY:  Right lower quadrant pain;    TECHNIQUE:  Low dose axial images, sagittal and coronal reformations were obtained from the lung bases to the pubic symphysis following the IV administration of 100 mL of Omnipaque 350 .  No oral contrast.    COMPARISON:  None.    FINDINGS:  The visualized lung bases demonstrate streaky bibasilar opacities suggestive of atelectasis and/or scarring.  No pleural fluid.  The visualized portions of the heart and pericardium are within normal limits.    The liver is prominent in size and diffusely hypoattenuating which can be seen with hepatic steatosis.  The main portal vein and splenic vein appear patent.  The spleen and pancreas are unremarkable.  There are bilateral adrenal gland nodules measuring 1.3 cm on the right and 1.7 cm on the left, incompletely characterized on this examination.  Large calcified stone identified in the gallbladder lumen.  No significant intra or extrahepatic biliary ductal dilatation.    The kidneys are normal in size and location and enhance symmetrically.  The right kidney  contains multiple nephroliths including the 2.0 cm and 1.6 cm calculus in the renal collecting system.  There are punctate nonobstructing left-sided nephroliths.  Left renal cortical hypodensities which do not demonstrate imaging characteristics of simple fluid.  There is nonspecific bilateral perinephric fat stranding.  The prostate is within normal limits.  There is wall thickening of the urinary bladder which could relate to nondistention, cystitis or chronic outlet obstruction noting mild prostatomegaly.    The abdominal aorta demonstrates a focal region of fusiform ectasia/dilatation in the infrarenal abdominal aorta with Tima centric left lateral hypoattenuation.  Findings could reflect short-segment focal dissection or ulcerated atheromatous plaque.  No retroperitoneal fluid/hemorrhage.    The appendix is dilated and fluid-filled measuring up to 1.8 cm with significant adjacent inflammatory change/stranding.  There is and adjacent 5.4 x 3.7 cm fluid and air containing structure concerning for associated perforation/abscess.  Inflammatory change involving the distal small bowel loops.  There are a few prominent fluid-filled loops of proximal small bowel.  Findings could relate to delayed transit/developing ileus.    The visualized osseous structures are in tact with degenerative change.  Small fat containing umbilical hernia.    Findings were communicated to Dr. Hansa France MD at 05:19 on 10/28/2024 via epic secure chat messenger.    This report was flagged in Epic as abnormal.                                        Pending Diagnostic Studies:       None           Medications:  Reconciled Home Medications:      Medication List        START taking these medications      amoxicillin-clavulanate 875-125mg 875-125 mg per tablet  Commonly known as: AUGMENTIN  Take 1 tablet by mouth every 12 (twelve) hours. for 10 days     aspirin 81 MG EC tablet  Commonly known as: ECOTRIN  Take 1 tablet (81 mg total) by mouth  once daily.     HYDROcodone-acetaminophen 5-325 mg per tablet  Commonly known as: NORCO  Take 1 tablet by mouth every 6 (six) hours as needed for Pain.              Indwelling Lines/Drains at time of discharge:   Lines/Drains/Airways       Drain  Duration                  Closed/Suction Drain 10/28/24 0837 Tube - 1 Anterior Abdomen Bulb 19 Fr. 2 days                    Time spent on the discharge of patient: 35 minutes         Norma Hunter NP  Department of Hospital Medicine  [facility]

## 2024-10-30 NOTE — PLAN OF CARE
CM called Dr. Ernandez's clinic to schedule follow up in 3 months - clinic requested facesheet. Facesheet and IP note from Dr. Ernandez faxed to clinic as requested - clinic to contact patient with appointment date/time     10/30/24 5519   Post-Acute Status   Hospital Resources/Appts/Education Provided Appointment suggestion unavailable

## 2024-10-31 ENCOUNTER — TELEPHONE (OUTPATIENT)
Dept: FAMILY MEDICINE | Facility: CLINIC | Age: 59
End: 2024-10-31
Payer: COMMERCIAL

## 2024-10-31 ENCOUNTER — PATIENT OUTREACH (OUTPATIENT)
Dept: ADMINISTRATIVE | Facility: CLINIC | Age: 59
End: 2024-10-31
Payer: COMMERCIAL

## 2024-11-10 ENCOUNTER — TELEPHONE (OUTPATIENT)
Dept: ADMINISTRATIVE | Facility: CLINIC | Age: 59
End: 2024-11-10
Payer: COMMERCIAL

## 2024-11-11 ENCOUNTER — PATIENT OUTREACH (OUTPATIENT)
Dept: ADMINISTRATIVE | Facility: OTHER | Age: 59
End: 2024-11-11
Payer: COMMERCIAL

## 2024-11-11 NOTE — PROGRESS NOTES
This Community Health Worker (CHW) completed Social Determinant of Health (SDOH)  Questionnaire with patient via telephone today.       Patient denied any SDOH needs at this time. Will speak with wife and think on if he needs any assistance.  Will contact me if he needs any assistance.

## 2024-11-12 ENCOUNTER — OFFICE VISIT (OUTPATIENT)
Dept: SURGERY | Facility: CLINIC | Age: 59
End: 2024-11-12
Payer: COMMERCIAL

## 2024-11-12 VITALS
BODY MASS INDEX: 34.07 KG/M2 | SYSTOLIC BLOOD PRESSURE: 131 MMHG | HEIGHT: 69 IN | WEIGHT: 230 LBS | TEMPERATURE: 97 F | HEART RATE: 89 BPM | DIASTOLIC BLOOD PRESSURE: 83 MMHG

## 2024-11-12 DIAGNOSIS — K35.33 ACUTE APPENDICITIS WITH PERFORATION, LOCALIZED PERITONITIS, AND ABSCESS, WITHOUT GANGRENE: Primary | ICD-10-CM

## 2024-11-12 PROCEDURE — 3075F SYST BP GE 130 - 139MM HG: CPT | Mod: CPTII,S$GLB,, | Performed by: SURGERY

## 2024-11-12 PROCEDURE — 99999 PR PBB SHADOW E&M-EST. PATIENT-LVL III: CPT | Mod: PBBFAC,,, | Performed by: SURGERY

## 2024-11-12 PROCEDURE — 3079F DIAST BP 80-89 MM HG: CPT | Mod: CPTII,S$GLB,, | Performed by: SURGERY

## 2024-11-12 PROCEDURE — 1160F RVW MEDS BY RX/DR IN RCRD: CPT | Mod: CPTII,S$GLB,, | Performed by: SURGERY

## 2024-11-12 PROCEDURE — 1159F MED LIST DOCD IN RCRD: CPT | Mod: CPTII,S$GLB,, | Performed by: SURGERY

## 2024-11-12 PROCEDURE — 99024 POSTOP FOLLOW-UP VISIT: CPT | Mod: S$GLB,,, | Performed by: SURGERY

## 2024-11-12 NOTE — PROGRESS NOTES
Subjective:       Patient ID: Landry Hartmann is a 59 y.o. male.    Chief Complaint: post op     HPI:  S/p lap appendectomy and JA placement for perforated appendicitis with abscess. Feeling well. Two more days of antibiotics. JA drain is serous. No fever. Tolerating diet. Path benign.     Review of Systems    Objective:      Physical Exam  Constitutional:       General: He is not in acute distress.  Pulmonary:      Effort: Pulmonary effort is normal. No respiratory distress.   Abdominal:      General: There is no distension.      Palpations: Abdomen is soft.      Tenderness: There is no abdominal tenderness. There is no guarding or rebound.      Comments: JA drain is serous. Removed in the office without issue.    Skin:     Comments: Incisions are clean, dry and intact  There is no evidence of infection, hematoma or seroma    Neurological:      Mental Status: He is alert and oriented to person, place, and time.   Psychiatric:         Behavior: Behavior is cooperative.         Assessment/Plan:   Acute appendicitis with perforation, localized peritonitis, and abscess, without gangrene      S/p appendectomy and JA placement. Path benign. JA removed. Doing well. Finish antibiotics. No evidence of complications. RTC RPN

## (undated) DEVICE — GLOVE SENSICARE PI MICRO 7.5

## (undated) DEVICE — ELECTRODE REM PLYHSV RETURN 9

## (undated) DEVICE — TRAY GENERAL LAPAROSCOPY SMH

## (undated) DEVICE — SUT ETHIBOND 0 CR/CT-2 8-18

## (undated) DEVICE — SUT ETHIBOND EXCEL 0 MO6 18

## (undated) DEVICE — EVACUATOR WOUND BULB 100CC

## (undated) DEVICE — SOL IRRI STRL WATER 1000ML

## (undated) DEVICE — BAG TISS RETRV MONARCH 10MM

## (undated) DEVICE — NDL PNEUMO INSUFFLATI 120MM

## (undated) DEVICE — SEALER LIGASURE MARYLAND 37CM

## (undated) DEVICE — SUT MCRYL PLUS 4-0 PS2 27IN

## (undated) DEVICE — TROCAR KII FIOS ZTHREAD 12X100

## (undated) DEVICE — GLOVE SENSICARE PI GRN 8

## (undated) DEVICE — TRAY CATH 1-LYR URIMTR 16FR

## (undated) DEVICE — NDL ECLIPSE SAFETY 23G 1.5IN

## (undated) DEVICE — DRAIN FULL FLUTED 19FR

## (undated) DEVICE — IRRIGATOR SUCTION W/TIP

## (undated) DEVICE — SOL NACL IRR 1000ML BTL

## (undated) DEVICE — NDL PNEUMOPERITONEUM 150MM

## (undated) DEVICE — SYS EXOFIN SKIN CLOSURE 22CM

## (undated) DEVICE — CART STAPLE FLEX ETX 3.5MM BLU

## (undated) DEVICE — SOL NACL IRR 3000ML

## (undated) DEVICE — TROCAR KII BLLN 12MM 10CM

## (undated) DEVICE — DISSECTOR KITTNER 5MM T 45CM S

## (undated) DEVICE — SYS SEE SHARP SCOPE ANTIFOG

## (undated) DEVICE — CANNULA LAP SEAL Z THRD 5X100